# Patient Record
Sex: MALE | Race: WHITE | NOT HISPANIC OR LATINO | Employment: FULL TIME | URBAN - METROPOLITAN AREA
[De-identification: names, ages, dates, MRNs, and addresses within clinical notes are randomized per-mention and may not be internally consistent; named-entity substitution may affect disease eponyms.]

---

## 2018-07-17 ENCOUNTER — APPOINTMENT (OUTPATIENT)
Dept: RADIOLOGY | Facility: HOSPITAL | Age: 28
DRG: 184 | End: 2018-07-17
Payer: COMMERCIAL

## 2018-07-17 ENCOUNTER — APPOINTMENT (EMERGENCY)
Dept: RADIOLOGY | Facility: HOSPITAL | Age: 28
End: 2018-07-17
Payer: COMMERCIAL

## 2018-07-17 ENCOUNTER — HOSPITAL ENCOUNTER (EMERGENCY)
Facility: HOSPITAL | Age: 28
End: 2018-07-17
Attending: EMERGENCY MEDICINE | Admitting: EMERGENCY MEDICINE
Payer: COMMERCIAL

## 2018-07-17 ENCOUNTER — HOSPITAL ENCOUNTER (INPATIENT)
Facility: HOSPITAL | Age: 28
LOS: 2 days | Discharge: HOME | DRG: 184 | End: 2018-07-20
Attending: EMERGENCY MEDICINE | Admitting: SURGERY
Payer: COMMERCIAL

## 2018-07-17 VITALS
DIASTOLIC BLOOD PRESSURE: 67 MMHG | WEIGHT: 180 LBS | OXYGEN SATURATION: 96 % | RESPIRATION RATE: 18 BRPM | HEART RATE: 84 BPM | TEMPERATURE: 99.4 F | SYSTOLIC BLOOD PRESSURE: 148 MMHG

## 2018-07-17 DIAGNOSIS — I31.3 PERICARDIAL EFFUSION: ICD-10-CM

## 2018-07-17 DIAGNOSIS — S22.20XA: ICD-10-CM

## 2018-07-17 DIAGNOSIS — S22.009A CLOSED FRACTURE OF TRANSVERSE PROCESS OF THORACIC VERTEBRA, INITIAL ENCOUNTER (HCC): ICD-10-CM

## 2018-07-17 DIAGNOSIS — S22.49XA MULTIPLE RIB FRACTURES: Primary | ICD-10-CM

## 2018-07-17 DIAGNOSIS — S62.640A CLOSED NONDISPLACED FRACTURE OF PROXIMAL PHALANX OF RIGHT INDEX FINGER, INITIAL ENCOUNTER: Primary | ICD-10-CM

## 2018-07-17 DIAGNOSIS — S62.609A FINGER FRACTURE, RIGHT: ICD-10-CM

## 2018-07-17 PROBLEM — S62.600A: Status: ACTIVE | Noted: 2018-07-17

## 2018-07-17 PROBLEM — S22.43XA CLOSED FRACTURE OF MULTIPLE RIBS OF BOTH SIDES: Status: ACTIVE | Noted: 2018-07-17

## 2018-07-17 PROBLEM — S22.21XA CLOSED FRACTURE OF MANUBRIUM: Status: ACTIVE | Noted: 2018-07-17

## 2018-07-17 PROBLEM — S22.009D CLOSED FRACTURE OF TRANSVERSE PROCESS OF THORACIC VERTEBRA WITH ROUTINE HEALING: Status: ACTIVE | Noted: 2018-07-17

## 2018-07-17 LAB
ALBUMIN SERPL BCP-MCNC: 3.7 G/DL (ref 3.5–5)
ALP SERPL-CCNC: 60 U/L (ref 46–116)
ALT SERPL W P-5'-P-CCNC: 45 U/L (ref 12–78)
ANION GAP SERPL CALCULATED.3IONS-SCNC: 8 MMOL/L (ref 4–13)
APTT PPP: 27 SECONDS (ref 24–33)
AST SERPL W P-5'-P-CCNC: 49 U/L (ref 5–45)
BASOPHILS # BLD AUTO: 0.06 THOUSANDS/ΜL (ref 0–0.1)
BASOPHILS NFR BLD AUTO: 0 % (ref 0–1)
BILIRUB SERPL-MCNC: 1 MG/DL (ref 0.2–1)
BUN SERPL-MCNC: 11 MG/DL (ref 5–25)
CALCIUM SERPL-MCNC: 9.1 MG/DL (ref 8.3–10.1)
CHLORIDE SERPL-SCNC: 101 MMOL/L (ref 100–108)
CO2 SERPL-SCNC: 28 MMOL/L (ref 21–32)
CREAT SERPL-MCNC: 0.96 MG/DL (ref 0.6–1.3)
EOSINOPHIL # BLD AUTO: 0.28 THOUSAND/ΜL (ref 0–0.61)
EOSINOPHIL NFR BLD AUTO: 2 % (ref 0–6)
ERYTHROCYTE [DISTWIDTH] IN BLOOD BY AUTOMATED COUNT: 12.7 % (ref 11.6–15.1)
GFR SERPL CREATININE-BSD FRML MDRD: 109 ML/MIN/1.73SQ M
GLUCOSE SERPL-MCNC: 100 MG/DL (ref 65–140)
HCT VFR BLD AUTO: 39.1 % (ref 36.5–49.3)
HGB BLD-MCNC: 12.9 G/DL (ref 12–17)
IMM GRANULOCYTES # BLD AUTO: 0.06 THOUSAND/UL (ref 0–0.2)
IMM GRANULOCYTES NFR BLD AUTO: 0 % (ref 0–2)
INR PPP: 0.97 (ref 0.86–1.16)
LYMPHOCYTES # BLD AUTO: 1.38 THOUSANDS/ΜL (ref 0.6–4.47)
LYMPHOCYTES NFR BLD AUTO: 10 % (ref 14–44)
MCH RBC QN AUTO: 28.5 PG (ref 26.8–34.3)
MCHC RBC AUTO-ENTMCNC: 33 G/DL (ref 31.4–37.4)
MCV RBC AUTO: 87 FL (ref 82–98)
MONOCYTES # BLD AUTO: 1.51 THOUSAND/ΜL (ref 0.17–1.22)
MONOCYTES NFR BLD AUTO: 11 % (ref 4–12)
NEUTROPHILS # BLD AUTO: 10.81 THOUSANDS/ΜL (ref 1.85–7.62)
NEUTS SEG NFR BLD AUTO: 77 % (ref 43–75)
NRBC BLD AUTO-RTO: 0 /100 WBCS
PLATELET # BLD AUTO: 237 THOUSANDS/UL (ref 149–390)
PMV BLD AUTO: 8.9 FL (ref 8.9–12.7)
POTASSIUM SERPL-SCNC: 3.5 MMOL/L (ref 3.5–5.3)
PROT SERPL-MCNC: 7.3 G/DL (ref 6.4–8.2)
PROTHROMBIN TIME: 10.2 SECONDS (ref 9.4–11.7)
RBC # BLD AUTO: 4.52 MILLION/UL (ref 3.88–5.62)
SODIUM SERPL-SCNC: 137 MMOL/L (ref 136–145)
TROPONIN I SERPL-MCNC: <0.02 NG/ML
TROPONIN I SERPL-MCNC: <0.02 NG/ML
WBC # BLD AUTO: 14.1 THOUSAND/UL (ref 4.31–10.16)

## 2018-07-17 PROCEDURE — 85025 COMPLETE CBC W/AUTO DIFF WBC: CPT | Performed by: EMERGENCY MEDICINE

## 2018-07-17 PROCEDURE — 85730 THROMBOPLASTIN TIME PARTIAL: CPT | Performed by: EMERGENCY MEDICINE

## 2018-07-17 PROCEDURE — 84484 ASSAY OF TROPONIN QUANT: CPT | Performed by: STUDENT IN AN ORGANIZED HEALTH CARE EDUCATION/TRAINING PROGRAM

## 2018-07-17 PROCEDURE — 99222 1ST HOSP IP/OBS MODERATE 55: CPT | Performed by: SURGERY

## 2018-07-17 PROCEDURE — 72170 X-RAY EXAM OF PELVIS: CPT

## 2018-07-17 PROCEDURE — 93005 ELECTROCARDIOGRAM TRACING: CPT

## 2018-07-17 PROCEDURE — 72125 CT NECK SPINE W/O DYE: CPT

## 2018-07-17 PROCEDURE — 85610 PROTHROMBIN TIME: CPT | Performed by: EMERGENCY MEDICINE

## 2018-07-17 PROCEDURE — 74177 CT ABD & PELVIS W/CONTRAST: CPT

## 2018-07-17 PROCEDURE — 70450 CT HEAD/BRAIN W/O DYE: CPT

## 2018-07-17 PROCEDURE — 73140 X-RAY EXAM OF FINGER(S): CPT

## 2018-07-17 PROCEDURE — 71250 CT THORAX DX C-: CPT

## 2018-07-17 PROCEDURE — 90715 TDAP VACCINE 7 YRS/> IM: CPT | Performed by: STUDENT IN AN ORGANIZED HEALTH CARE EDUCATION/TRAINING PROGRAM

## 2018-07-17 PROCEDURE — 94760 N-INVAS EAR/PLS OXIMETRY 1: CPT

## 2018-07-17 PROCEDURE — 73130 X-RAY EXAM OF HAND: CPT

## 2018-07-17 PROCEDURE — 99285 EMERGENCY DEPT VISIT HI MDM: CPT

## 2018-07-17 PROCEDURE — 90471 IMMUNIZATION ADMIN: CPT

## 2018-07-17 PROCEDURE — 80053 COMPREHEN METABOLIC PANEL: CPT | Performed by: EMERGENCY MEDICINE

## 2018-07-17 PROCEDURE — 71260 CT THORAX DX C+: CPT

## 2018-07-17 PROCEDURE — 36415 COLL VENOUS BLD VENIPUNCTURE: CPT | Performed by: EMERGENCY MEDICINE

## 2018-07-17 RX ORDER — OXYCODONE HYDROCHLORIDE 5 MG/1
5 TABLET ORAL EVERY 4 HOURS PRN
Status: DISCONTINUED | OUTPATIENT
Start: 2018-07-17 | End: 2018-07-19

## 2018-07-17 RX ORDER — DIAZEPAM 5 MG/1
5 TABLET ORAL EVERY 6 HOURS PRN
Status: DISCONTINUED | OUTPATIENT
Start: 2018-07-17 | End: 2018-07-19

## 2018-07-17 RX ORDER — ONDANSETRON 2 MG/ML
4 INJECTION INTRAMUSCULAR; INTRAVENOUS EVERY 4 HOURS PRN
Status: DISCONTINUED | OUTPATIENT
Start: 2018-07-17 | End: 2018-07-18

## 2018-07-17 RX ORDER — SODIUM CHLORIDE, SODIUM GLUCONATE, SODIUM ACETATE, POTASSIUM CHLORIDE, MAGNESIUM CHLORIDE, SODIUM PHOSPHATE, DIBASIC, AND POTASSIUM PHOSPHATE .53; .5; .37; .037; .03; .012; .00082 G/100ML; G/100ML; G/100ML; G/100ML; G/100ML; G/100ML; G/100ML
50 INJECTION, SOLUTION INTRAVENOUS CONTINUOUS
Status: DISCONTINUED | OUTPATIENT
Start: 2018-07-17 | End: 2018-07-20

## 2018-07-17 RX ORDER — LIDOCAINE 50 MG/G
2 PATCH TOPICAL DAILY
Status: DISCONTINUED | OUTPATIENT
Start: 2018-07-17 | End: 2018-07-20 | Stop reason: HOSPADM

## 2018-07-17 RX ORDER — LIDOCAINE 50 MG/G
1 PATCH TOPICAL DAILY
Status: DISCONTINUED | OUTPATIENT
Start: 2018-07-17 | End: 2018-07-17

## 2018-07-17 RX ORDER — METHOCARBAMOL 500 MG/1
500 TABLET, FILM COATED ORAL EVERY 6 HOURS SCHEDULED
Status: DISCONTINUED | OUTPATIENT
Start: 2018-07-17 | End: 2018-07-18

## 2018-07-17 RX ORDER — ACETAMINOPHEN 325 MG/1
650 TABLET ORAL EVERY 6 HOURS SCHEDULED
Status: DISCONTINUED | OUTPATIENT
Start: 2018-07-17 | End: 2018-07-18

## 2018-07-17 RX ORDER — ONDANSETRON 2 MG/ML
4 INJECTION INTRAMUSCULAR; INTRAVENOUS ONCE
Status: COMPLETED | OUTPATIENT
Start: 2018-07-17 | End: 2018-07-17

## 2018-07-17 RX ORDER — OXYCODONE HYDROCHLORIDE 10 MG/1
10 TABLET ORAL EVERY 4 HOURS PRN
Status: DISCONTINUED | OUTPATIENT
Start: 2018-07-17 | End: 2018-07-19

## 2018-07-17 RX ORDER — OXYCODONE HYDROCHLORIDE AND ACETAMINOPHEN 5; 325 MG/1; MG/1
1 TABLET ORAL ONCE
Status: COMPLETED | OUTPATIENT
Start: 2018-07-17 | End: 2018-07-17

## 2018-07-17 RX ADMIN — DIAZEPAM 5 MG: 5 TABLET ORAL at 20:12

## 2018-07-17 RX ADMIN — OXYCODONE HYDROCHLORIDE AND ACETAMINOPHEN 1 TABLET: 5; 325 TABLET ORAL at 12:20

## 2018-07-17 RX ADMIN — IOHEXOL 100 ML: 350 INJECTION, SOLUTION INTRAVENOUS at 20:40

## 2018-07-17 RX ADMIN — ONDANSETRON 4 MG: 2 INJECTION, SOLUTION INTRAMUSCULAR; INTRAVENOUS at 20:12

## 2018-07-17 RX ADMIN — HYDROMORPHONE HYDROCHLORIDE 0.2 MG: 1 INJECTION, SOLUTION INTRAMUSCULAR; INTRAVENOUS; SUBCUTANEOUS at 21:08

## 2018-07-17 RX ADMIN — ACETAMINOPHEN 650 MG: 325 TABLET, FILM COATED ORAL at 17:48

## 2018-07-17 RX ADMIN — ONDANSETRON 4 MG: 2 INJECTION, SOLUTION INTRAMUSCULAR; INTRAVENOUS at 18:04

## 2018-07-17 RX ADMIN — ACETAMINOPHEN 650 MG: 325 TABLET, FILM COATED ORAL at 23:14

## 2018-07-17 RX ADMIN — LIDOCAINE 2 PATCH: 50 PATCH CUTANEOUS at 17:55

## 2018-07-17 RX ADMIN — METHOCARBAMOL 500 MG: 500 TABLET ORAL at 17:48

## 2018-07-17 RX ADMIN — HYDROMORPHONE HYDROCHLORIDE 1 MG: 1 INJECTION, SOLUTION INTRAMUSCULAR; INTRAVENOUS; SUBCUTANEOUS at 13:24

## 2018-07-17 RX ADMIN — METHOCARBAMOL 500 MG: 500 TABLET ORAL at 23:14

## 2018-07-17 RX ADMIN — OXYCODONE HYDROCHLORIDE 10 MG: 10 TABLET ORAL at 23:14

## 2018-07-17 RX ADMIN — OXYCODONE HYDROCHLORIDE 10 MG: 10 TABLET ORAL at 18:08

## 2018-07-17 RX ADMIN — SODIUM CHLORIDE, SODIUM ACETATE ANHYDROUS, SODIUM GLUCONATE, POTASSIUM CHLORIDE, AND MAGNESIUM CHLORIDE 85 ML/HR: 526; 222; 502; 37; 30 INJECTION, SOLUTION INTRAVENOUS at 17:49

## 2018-07-17 RX ADMIN — TETANUS TOXOID, REDUCED DIPHTHERIA TOXOID AND ACELLULAR PERTUSSIS VACCINE, ADSORBED 0.5 ML: 5; 2.5; 8; 8; 2.5 SUSPENSION INTRAMUSCULAR at 18:32
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
## 2018-07-17 NOTE — EMTALA/ACUTE CARE TRANSFER
[facility] EMERGENCY DEPARTMENT  [address] [id number]  Dept: [phone]      EMTALA TRANSFER CONSENT    NAME Ita Catherine                                         1990                              MRN [id number]    I have been informed of my rights regarding examination, treatment, and transfer   by Dr Jessica Azevedo DO    Benefits: Specialized equipment and/or services available at the receiving facility (Include comment)________________________    Risks: Potential for delay in receiving treatment, Potential deterioration of medical condition, Loss of IV, Increased discomfort during transfer, Possible worsening of condition or death during transfer      Transfer Request   I acknowledge that my medical condition has been evaluated and explained to me by the emergency department physician or other qualified medical person and/or my attending physician who has recommended and offered to me further medical examination and treatment  I understand the Hospital's obligation with respect to the treatment and stabilization of my emergency medical condition  I nevertheless request to be transferred  I release the Hospital, the doctor, and any other persons caring for me from all responsibility or liability for any injury or ill effects that may result from my transfer and agree to accept all responsibility for the consequences of my choice to transfer, rather than receive stabilizing treatment at the Hospital  I understand that because the transfer is my request, my insurance may not provide reimbursement for the services  The Hospital will assist and direct me and my family in how to make arrangements for transfer, but the hospital is not liable for any fees charged by the transport service    In spite of this understanding, I refuse to consent to further medical examination and treatment which has been offered to me, and request transfer to  [place]  Name, [facility] & State : [place]  I authorize the performance of emergency medical procedures and treatments upon me in both transit and upon arrival at the receiving facility  Additionally, I authorize the release of any and all medical records to the receiving facility and request they be transported with me, if possible  I authorize the performance of emergency medical procedures and treatments upon me in both transit and upon arrival at the receiving facility  Additionally, I authorize the release of any and all medical records to the receiving facility and request they be transported with me, if possible  I understand that the safest mode of transportation during a medical emergency is an ambulance and that the Hospital advocates the use of this mode of transport  Risks of traveling to the receiving facility by car, including absence of medical control, life sustaining equipment, such as oxygen, and medical personnel has been explained to me and I fully understand them  (JAK CORRECT BOX BELOW)  [  ]  I consent to the stated transfer and to be transported by ambulance/helicopter  [  ]  I consent to the stated transfer, but refuse transportation by ambulance and accept full responsibility for my transportation by car  I understand the risks of non-ambulance transfers and I exonerate the Hospital and its staff from any deterioration in my condition that results from this refusal     X___________________________________________    DATE  18  TIME________  Signature of patient or legally responsible individual signing on patient behalf           RELATIONSHIP TO PATIENT_________________________          Provider Certification    NAME Clemencia Wagonerwith                                         1990                              MRN [id number]    A medical screening exam was performed on the above named patient    Based on the examination:    Condition Necessitating Transfer The primary encounter diagnosis was Multiple rib fractures  Diagnoses of Closed fracture sternum, Pericardial effusion, Closed fracture of transverse process of thoracic vertebra, initial encounter ([facility] [address] ), and Finger fracture, right were also pertinent to this visit  Patient Condition: The patient has been stabilized such that within reasonable medical probability, no material deterioration of the patient condition or the condition of the unborn child(julian) is likely to result from the transfer    Reason for Transfer: Level of Care needed not available at this facility    Transfer Requirements: Facility [employer] available and qualified personnel available for treatment as acknowledged by    · Agreed to accept transfer and to provide appropriate medical treatment as acknowledged by       Dr Farooq Dow  · Appropriate medical records of the examination and treatment of the patient are provided at the time of transfer   [facility], [address] _______  · Transfer will be performed by qualified personnel from [facility]  and appropriate transfer equipment as required, including the use of necessary and appropriate life support measures      Provider Certification: I have examined the patient and explained the following risks and benefits of being transferred/refusing transfer to the patient/family:  General risk, such as traffic hazards, adverse weather conditions, rough terrain or turbulence, possible failure of equipment (including vehicle or aircraft), or consequences of actions of persons outside the control of the transport personnel      Based on these reasonable risks and benefits to the patient and/or the unborn child(julian), and based upon the information available at the time of the patients examination, I certify that the medical benefits reasonably to be expected from the provision of appropriate medical treatments at another medical facility outweigh the increasing risks, if any, to the individuals medical condition, and in the case of labor to the unborn child, from effecting the transfer      X____________________________________________ DATE 07/17/18        TIME_______      ORIGINAL - SEND TO MEDICAL RECORDS   COPY - SEND WITH PATIENT DURING TRANSFER
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
## 2018-07-17 NOTE — EMTALA/ACUTE CARE TRANSFER
[facility] EMERGENCY DEPARTMENT  [address] [facility] [address]  Dept: [phone]      EMTALA TRANSFER CONSENT    NAME Marlin Thomas                                         1990                              MRN [id number]    I have been informed of my rights regarding examination, treatment, and transfer   by Dr Madeleine Clancy DO    Benefits: Specialized equipment and/or services available at the receiving facility (Include comment)________________________    Risks: Potential for delay in receiving treatment, Potential deterioration of medical condition, Loss of IV, Increased discomfort during transfer, Possible worsening of condition or death during transfer      Consent for Transfer:  I acknowledge that my medical condition has been evaluated and explained to me by the emergency department physician or other qualified medical person and/or my attending physician, who has recommended that I be transferred to the service of  Accepting Physician: Dr Kristi Neal at [facility] [place]  The above potential benefits of such transfer, the potential risks associated with such transfer, and the probable risks of not being transferred have been explained to me, and I fully understand them  The doctor has explained that, in my case, the benefits of transfer outweigh the risks  I agree to be transferred  I authorize the performance of emergency medical procedures and treatments upon me in both transit and upon arrival at the receiving facility  Additionally, I authorize the release of any and all medical records to the receiving facility and request they be transported with me, if possible  I understand that the safest mode of transportation during a medical emergency is an ambulance and that the Hospital advocates the use of this mode of transport   Risks of traveling to the receiving facility by car, including absence of medical control, life sustaining equipment, such as oxygen, and medical personnel has been explained to me and I fully understand them  (JAK CORRECT BOX BELOW)  [  ]  I consent to the stated transfer and to be transported by ambulance/helicopter  [  ]  I consent to the stated transfer, but refuse transportation by ambulance and accept full responsibility for my transportation by car  I understand the risks of non-ambulance transfers and I exonerate the Hospital and its staff from any deterioration in my condition that results from this refusal     X___________________________________________    DATE  18  TIME________  Signature of patient or legally responsible individual signing on patient behalf           RELATIONSHIP TO PATIENT_________________________          Provider Certification    NAME Henry Feliciano                                         1990                              MRN [id number]    A medical screening exam was performed on the above named patient  Based on the examination:    Condition Necessitating Transfer The primary encounter diagnosis was Multiple rib fractures  Diagnoses of Closed fracture sternum, Pericardial effusion, and Closed fracture of transverse process of thoracic vertebra, initial encounter [facility]) were also pertinent to this visit      Patient Condition: The patient has been stabilized such that within reasonable medical probability, no material deterioration of the patient condition or the condition of the unborn child(julian) is likely to result from the transfer    Reason for Transfer: Level of Care needed not available at this facility    Transfer Requirements: Facility [employer] available and qualified personnel available for treatment as acknowledged by    · Agreed to accept transfer and to provide appropriate medical treatment as acknowledged by       Dr Arline Ortiz  · Appropriate medical records of the examination and treatment of the patient are provided at the time of transfer STAFF INITIAL WHEN COMPLETED _______  · Transfer will be performed by qualified personnel from    and appropriate transfer equipment as required, including the use of necessary and appropriate life support measures  Provider Certification: I have examined the patient and explained the following risks and benefits of being transferred/refusing transfer to the patient/family:  General risk, such as traffic hazards, adverse weather conditions, rough terrain or turbulence, possible failure of equipment (including vehicle or aircraft), or consequences of actions of persons outside the control of the transport personnel      Based on these reasonable risks and benefits to the patient and/or the unborn child(julian), and based upon the information available at the time of the patients examination, I certify that the medical benefits reasonably to be expected from the provision of appropriate medical treatments at another medical facility outweigh the increasing risks, if any, to the individuals medical condition, and in the case of labor to the unborn child, from effecting the transfer      X____________________________________________ DATE 07/17/18        TIME_______      ORIGINAL - SEND TO MEDICAL RECORDS   COPY - SEND WITH PATIENT DURING TRANSFER

## 2018-07-18 ENCOUNTER — APPOINTMENT (OUTPATIENT)
Dept: RADIOLOGY | Facility: HOSPITAL | Age: 28
DRG: 184 | End: 2018-07-18
Payer: COMMERCIAL

## 2018-07-18 ENCOUNTER — APPOINTMENT (OUTPATIENT)
Dept: NON INVASIVE DIAGNOSTICS | Facility: HOSPITAL | Age: 28
DRG: 184 | End: 2018-07-18
Payer: COMMERCIAL

## 2018-07-18 LAB
ANION GAP SERPL CALCULATED.3IONS-SCNC: 6 MMOL/L (ref 4–13)
ATRIAL RATE: 90 BPM
BASOPHILS # BLD AUTO: 0.06 THOUSANDS/ΜL (ref 0–0.1)
BASOPHILS NFR BLD AUTO: 1 % (ref 0–1)
BUN SERPL-MCNC: 13 MG/DL (ref 5–25)
CALCIUM SERPL-MCNC: 8.8 MG/DL (ref 8.3–10.1)
CHLORIDE SERPL-SCNC: 99 MMOL/L (ref 100–108)
CO2 SERPL-SCNC: 27 MMOL/L (ref 21–32)
CREAT SERPL-MCNC: 0.85 MG/DL (ref 0.6–1.3)
EOSINOPHIL # BLD AUTO: 0.09 THOUSAND/ΜL (ref 0–0.61)
EOSINOPHIL NFR BLD AUTO: 1 % (ref 0–6)
ERYTHROCYTE [DISTWIDTH] IN BLOOD BY AUTOMATED COUNT: 12.7 % (ref 11.6–15.1)
GFR SERPL CREATININE-BSD FRML MDRD: 120 ML/MIN/1.73SQ M
GLUCOSE SERPL-MCNC: 93 MG/DL (ref 65–140)
HCT VFR BLD AUTO: 36.5 % (ref 36.5–49.3)
HGB BLD-MCNC: 11.9 G/DL (ref 12–17)
IMM GRANULOCYTES # BLD AUTO: 0.04 THOUSAND/UL (ref 0–0.2)
IMM GRANULOCYTES NFR BLD AUTO: 0 % (ref 0–2)
LYMPHOCYTES # BLD AUTO: 0.97 THOUSANDS/ΜL (ref 0.6–4.47)
LYMPHOCYTES NFR BLD AUTO: 8 % (ref 14–44)
MCH RBC QN AUTO: 28.1 PG (ref 26.8–34.3)
MCHC RBC AUTO-ENTMCNC: 32.6 G/DL (ref 31.4–37.4)
MCV RBC AUTO: 86 FL (ref 82–98)
MONOCYTES # BLD AUTO: 1.82 THOUSAND/ΜL (ref 0.17–1.22)
MONOCYTES NFR BLD AUTO: 15 % (ref 4–12)
NEUTROPHILS # BLD AUTO: 9.29 THOUSANDS/ΜL (ref 1.85–7.62)
NEUTS SEG NFR BLD AUTO: 75 % (ref 43–75)
NRBC BLD AUTO-RTO: 0 /100 WBCS
P AXIS: 71 DEGREES
PLATELET # BLD AUTO: 223 THOUSANDS/UL (ref 149–390)
PMV BLD AUTO: 9.6 FL (ref 8.9–12.7)
POTASSIUM SERPL-SCNC: 3.4 MMOL/L (ref 3.5–5.3)
PR INTERVAL: 156 MS
QRS AXIS: 91 DEGREES
QRSD INTERVAL: 104 MS
QT INTERVAL: 328 MS
QTC INTERVAL: 401 MS
RBC # BLD AUTO: 4.24 MILLION/UL (ref 3.88–5.62)
SODIUM SERPL-SCNC: 132 MMOL/L (ref 136–145)
T WAVE AXIS: 83 DEGREES
VENTRICULAR RATE: 90 BPM
WBC # BLD AUTO: 12.27 THOUSAND/UL (ref 4.31–10.16)

## 2018-07-18 PROCEDURE — G8988 SELF CARE GOAL STATUS: HCPCS

## 2018-07-18 PROCEDURE — 97535 SELF CARE MNGMENT TRAINING: CPT

## 2018-07-18 PROCEDURE — 97163 PT EVAL HIGH COMPLEX 45 MIN: CPT

## 2018-07-18 PROCEDURE — G8987 SELF CARE CURRENT STATUS: HCPCS

## 2018-07-18 PROCEDURE — G8978 MOBILITY CURRENT STATUS: HCPCS

## 2018-07-18 PROCEDURE — 99221 1ST HOSP IP/OBS SF/LOW 40: CPT | Performed by: INTERNAL MEDICINE

## 2018-07-18 PROCEDURE — 94760 N-INVAS EAR/PLS OXIMETRY 1: CPT

## 2018-07-18 PROCEDURE — 80048 BASIC METABOLIC PNL TOTAL CA: CPT | Performed by: STUDENT IN AN ORGANIZED HEALTH CARE EDUCATION/TRAINING PROGRAM

## 2018-07-18 PROCEDURE — 85025 COMPLETE CBC W/AUTO DIFF WBC: CPT | Performed by: STUDENT IN AN ORGANIZED HEALTH CARE EDUCATION/TRAINING PROGRAM

## 2018-07-18 PROCEDURE — 87040 BLOOD CULTURE FOR BACTERIA: CPT | Performed by: STUDENT IN AN ORGANIZED HEALTH CARE EDUCATION/TRAINING PROGRAM

## 2018-07-18 PROCEDURE — G8979 MOBILITY GOAL STATUS: HCPCS

## 2018-07-18 PROCEDURE — 93306 TTE W/DOPPLER COMPLETE: CPT

## 2018-07-18 PROCEDURE — 93306 TTE W/DOPPLER COMPLETE: CPT | Performed by: INTERNAL MEDICINE

## 2018-07-18 PROCEDURE — 97167 OT EVAL HIGH COMPLEX 60 MIN: CPT

## 2018-07-18 PROCEDURE — 93010 ELECTROCARDIOGRAM REPORT: CPT | Performed by: INTERNAL MEDICINE

## 2018-07-18 PROCEDURE — 71046 X-RAY EXAM CHEST 2 VIEWS: CPT

## 2018-07-18 RX ORDER — ACETAMINOPHEN 325 MG/1
975 TABLET ORAL EVERY 8 HOURS SCHEDULED
Status: DISCONTINUED | OUTPATIENT
Start: 2018-07-18 | End: 2018-07-20 | Stop reason: HOSPADM

## 2018-07-18 RX ORDER — DIAZEPAM 5 MG/1
5 TABLET ORAL EVERY 8 HOURS
Status: DISCONTINUED | OUTPATIENT
Start: 2018-07-18 | End: 2018-07-19

## 2018-07-18 RX ORDER — NICOTINE 21 MG/24HR
14 PATCH, TRANSDERMAL 24 HOURS TRANSDERMAL DAILY
Status: DISCONTINUED | OUTPATIENT
Start: 2018-07-18 | End: 2018-07-20 | Stop reason: HOSPADM

## 2018-07-18 RX ORDER — GINSENG 100 MG
1 CAPSULE ORAL 2 TIMES DAILY
Status: DISCONTINUED | OUTPATIENT
Start: 2018-07-18 | End: 2018-07-19

## 2018-07-18 RX ORDER — LANOLIN ALCOHOL/MO/W.PET/CERES
6 CREAM (GRAM) TOPICAL
Status: DISCONTINUED | OUTPATIENT
Start: 2018-07-18 | End: 2018-07-20 | Stop reason: HOSPADM

## 2018-07-18 RX ORDER — ONDANSETRON 2 MG/ML
4 INJECTION INTRAMUSCULAR; INTRAVENOUS EVERY 6 HOURS PRN
Status: DISCONTINUED | OUTPATIENT
Start: 2018-07-18 | End: 2018-07-20 | Stop reason: HOSPADM

## 2018-07-18 RX ADMIN — OXYCODONE HYDROCHLORIDE 10 MG: 10 TABLET ORAL at 20:02

## 2018-07-18 RX ADMIN — HYDROMORPHONE HYDROCHLORIDE 0.2 MG: 1 INJECTION, SOLUTION INTRAMUSCULAR; INTRAVENOUS; SUBCUTANEOUS at 00:29

## 2018-07-18 RX ADMIN — LIDOCAINE 2 PATCH: 50 PATCH CUTANEOUS at 08:45

## 2018-07-18 RX ADMIN — METHOCARBAMOL 500 MG: 500 TABLET ORAL at 05:14

## 2018-07-18 RX ADMIN — SODIUM CHLORIDE, SODIUM ACETATE ANHYDROUS, SODIUM GLUCONATE, POTASSIUM CHLORIDE, AND MAGNESIUM CHLORIDE 85 ML/HR: 526; 222; 502; 37; 30 INJECTION, SOLUTION INTRAVENOUS at 05:20

## 2018-07-18 RX ADMIN — DIAZEPAM 5 MG: 5 TABLET ORAL at 17:22

## 2018-07-18 RX ADMIN — ONDANSETRON 4 MG: 2 INJECTION, SOLUTION INTRAMUSCULAR; INTRAVENOUS at 16:36

## 2018-07-18 RX ADMIN — OXYCODONE HYDROCHLORIDE 10 MG: 10 TABLET ORAL at 14:32

## 2018-07-18 RX ADMIN — ACETAMINOPHEN 650 MG: 325 TABLET, FILM COATED ORAL at 05:14

## 2018-07-18 RX ADMIN — ACETAMINOPHEN 975 MG: 325 TABLET, FILM COATED ORAL at 22:31

## 2018-07-18 RX ADMIN — MELATONIN TAB 3 MG 6 MG: 3 TAB at 22:31

## 2018-07-18 RX ADMIN — SODIUM CHLORIDE, SODIUM ACETATE ANHYDROUS, SODIUM GLUCONATE, POTASSIUM CHLORIDE, AND MAGNESIUM CHLORIDE 85 ML/HR: 526; 222; 502; 37; 30 INJECTION, SOLUTION INTRAVENOUS at 18:11

## 2018-07-18 RX ADMIN — ACETAMINOPHEN 975 MG: 325 TABLET, FILM COATED ORAL at 14:31

## 2018-07-18 RX ADMIN — NICOTINE 14 MG: 14 PATCH, EXTENDED RELEASE TRANSDERMAL at 12:49

## 2018-07-18 RX ADMIN — BACITRACIN ZINC 1 SMALL APPLICATION: 500 OINTMENT TOPICAL at 17:22

## 2018-07-18 RX ADMIN — ONDANSETRON 4 MG: 2 INJECTION, SOLUTION INTRAMUSCULAR; INTRAVENOUS at 21:27

## 2018-07-18 RX ADMIN — OXYCODONE HYDROCHLORIDE 10 MG: 10 TABLET ORAL at 08:45

## 2018-07-18 RX ADMIN — MELATONIN TAB 3 MG 6 MG: 3 TAB at 00:29

## 2018-07-18 RX ADMIN — METHOCARBAMOL 500 MG: 500 TABLET ORAL at 12:49

## 2018-07-18 RX ADMIN — OXYCODONE HYDROCHLORIDE 10 MG: 10 TABLET ORAL at 04:45
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
## 2018-07-18 NOTE — PROGRESS NOTES
Nurse / Provider rounds completed with staff nurse, [id number]ys and patient who is currently receiving a TTE and Cardiology consulted to read and evaluate

## 2018-07-18 NOTE — PROGRESS NOTES
Spoke with John Malone NP with trauma team, aware pt drowsy today, low grade temps, Na 132, new orders for cards consult and echo, continue to monitor pt
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
## 2018-07-18 NOTE — PLAN OF CARE
Problem: PAIN - ADULT  Goal: Verbalizes/displays adequate comfort level or baseline comfort level  Interventions:  - Encourage patient to monitor pain and request assistance  - Assess pain using appropriate pain scale  - Administer analgesics based on type and severity of pain and evaluate response  - Implement non-pharmacological measures as appropriate and evaluate response  - Consider cultural and social influences on pain and pain management  - Notify physician/advanced practitioner if interventions unsuccessful or patient reports new pain  Outcome: Progressing      Problem: INFECTION - ADULT  Goal: Absence or prevention of progression during hospitalization  INTERVENTIONS:  - Assess and monitor for signs and symptoms of infection  - Monitor lab/diagnostic results  - Monitor all insertion sites, i e  indwelling lines, tubes, and drains  - Monitor endotracheal (as able) and nasal secretions for changes in amount and color  - [place] appropriate cooling/warming therapies per order  - Administer medications as ordered  - Instruct and encourage patient and family to use good hand hygiene technique  - Identify and instruct in appropriate isolation precautions for identified infection/condition  Outcome: Progressing    Goal: Absence of fever/infection during neutropenic period  INTERVENTIONS:  - Monitor WBC  - Implement neutropenic guidelines  Outcome: Progressing      Problem: SAFETY ADULT  Goal: Patient will remain free of falls  INTERVENTIONS:  - Assess patient frequently for physical needs  -  Identify cognitive and physical deficits and behaviors that affect risk of falls    -  [place] fall precautions as indicated by assessment   - Educate patient/family on patient safety including physical limitations  - Instruct patient to call for assistance with activity based on assessment  - Modify environment to reduce risk of injury  - Consider OT/PT consult to assist with strengthening/mobility  Outcome: Progressing    Goal: Maintain or return to baseline ADL function  INTERVENTIONS:  -  Assess patient's ability to carry out ADLs; assess patient's baseline for ADL function and identify physical deficits which impact ability to perform ADLs (bathing, care of mouth/teeth, toileting, grooming, dressing, etc )  - Assess/evaluate cause of self-care deficits   - Assess range of motion  - Assess patient's mobility; develop plan if impaired  - Assess patient's need for assistive devices and provide as appropriate  - Encourage maximum independence but intervene and supervise when necessary  ¯ Involve family in performance of ADLs  ¯ Assess for home care needs following discharge   ¯ Request OT consult to assist with ADL evaluation and planning for discharge  ¯ Provide patient education as appropriate  Outcome: Progressing    Goal: Maintain or return mobility status to optimal level  INTERVENTIONS:  - Assess patient's baseline mobility status (ambulation, transfers, stairs, etc )    - Identify cognitive and physical deficits and behaviors that affect mobility  - Identify mobility aids required to assist with transfers and/or ambulation (gait belt, sit-to-stand, lift, walker, cane, etc )  - [place] fall precautions as indicated by assessment  - Record patient progress and toleration of activity level on Mobility SBAR; progress patient to next Phase/Stage  - Instruct patient to call for assistance with activity based on assessment  - Request Rehabilitation consult to assist with strengthening/weightbearing, etc   Outcome: Progressing      Problem: DISCHARGE PLANNING  Goal: Discharge to home or other facility with appropriate resources  INTERVENTIONS:  - Identify barriers to discharge w/patient and caregiver  - Arrange for needed discharge resources and transportation as appropriate  - Identify discharge learning needs (meds, wound care, etc )  - Arrange for interpretive services to assist at discharge as needed  - Refer to Case Management Department for coordinating discharge planning if the patient needs post-hospital services based on physician/advanced practitioner order or complex needs related to functional status, cognitive ability, or social support system  Outcome: Progressing
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
## 2018-07-19 PROCEDURE — 97116 GAIT TRAINING THERAPY: CPT

## 2018-07-19 PROCEDURE — 97530 THERAPEUTIC ACTIVITIES: CPT

## 2018-07-19 RX ORDER — HYDROMORPHONE HYDROCHLORIDE 2 MG/1
4 TABLET ORAL EVERY 4 HOURS PRN
Status: DISCONTINUED | OUTPATIENT
Start: 2018-07-19 | End: 2018-07-20 | Stop reason: HOSPADM

## 2018-07-19 RX ORDER — LIDOCAINE 50 MG/G
1 PATCH TOPICAL DAILY
Status: DISCONTINUED | OUTPATIENT
Start: 2018-07-19 | End: 2018-07-19 | Stop reason: SDUPTHER

## 2018-07-19 RX ORDER — PANTOPRAZOLE SODIUM 40 MG/1
40 TABLET, DELAYED RELEASE ORAL
Status: DISCONTINUED | OUTPATIENT
Start: 2018-07-20 | End: 2018-07-20 | Stop reason: HOSPADM

## 2018-07-19 RX ORDER — HYDROMORPHONE HYDROCHLORIDE 2 MG/1
2 TABLET ORAL EVERY 4 HOURS PRN
Status: DISCONTINUED | OUTPATIENT
Start: 2018-07-19 | End: 2018-07-20 | Stop reason: HOSPADM

## 2018-07-19 RX ORDER — GINSENG 100 MG
1 CAPSULE ORAL DAILY
Status: DISCONTINUED | OUTPATIENT
Start: 2018-07-19 | End: 2018-07-20 | Stop reason: HOSPADM

## 2018-07-19 RX ORDER — DIAZEPAM 5 MG/1
5 TABLET ORAL EVERY 6 HOURS SCHEDULED
Status: DISCONTINUED | OUTPATIENT
Start: 2018-07-19 | End: 2018-07-20 | Stop reason: HOSPADM

## 2018-07-19 RX ADMIN — OXYCODONE HYDROCHLORIDE 10 MG: 10 TABLET ORAL at 13:19

## 2018-07-19 RX ADMIN — DIAZEPAM 5 MG: 5 TABLET ORAL at 02:55

## 2018-07-19 RX ADMIN — ACETAMINOPHEN 975 MG: 325 TABLET, FILM COATED ORAL at 21:06

## 2018-07-19 RX ADMIN — LIDOCAINE 1 PATCH: 50 PATCH CUTANEOUS at 13:46

## 2018-07-19 RX ADMIN — ONDANSETRON 4 MG: 2 INJECTION, SOLUTION INTRAMUSCULAR; INTRAVENOUS at 19:56

## 2018-07-19 RX ADMIN — DIAZEPAM 5 MG: 5 TABLET ORAL at 14:21

## 2018-07-19 RX ADMIN — ACETAMINOPHEN 975 MG: 325 TABLET, FILM COATED ORAL at 13:20

## 2018-07-19 RX ADMIN — SODIUM CHLORIDE, SODIUM ACETATE ANHYDROUS, SODIUM GLUCONATE, POTASSIUM CHLORIDE, AND MAGNESIUM CHLORIDE 85 ML/HR: 526; 222; 502; 37; 30 INJECTION, SOLUTION INTRAVENOUS at 05:25

## 2018-07-19 RX ADMIN — ACETAMINOPHEN 975 MG: 325 TABLET, FILM COATED ORAL at 05:25

## 2018-07-19 RX ADMIN — HYDROMORPHONE HYDROCHLORIDE 2 MG: 2 TABLET ORAL at 16:51

## 2018-07-19 RX ADMIN — SODIUM CHLORIDE, SODIUM ACETATE ANHYDROUS, SODIUM GLUCONATE, POTASSIUM CHLORIDE, AND MAGNESIUM CHLORIDE 85 ML/HR: 526; 222; 502; 37; 30 INJECTION, SOLUTION INTRAVENOUS at 16:54

## 2018-07-19 RX ADMIN — DIAZEPAM 5 MG: 5 TABLET ORAL at 09:28

## 2018-07-19 RX ADMIN — MELATONIN TAB 3 MG 6 MG: 3 TAB at 21:07

## 2018-07-19 RX ADMIN — OXYCODONE HYDROCHLORIDE 10 MG: 10 TABLET ORAL at 09:28

## 2018-07-19 RX ADMIN — LIDOCAINE 2 PATCH: 50 PATCH CUTANEOUS at 09:29

## 2018-07-19 RX ADMIN — HYDROMORPHONE HYDROCHLORIDE 0.2 MG: 1 INJECTION, SOLUTION INTRAMUSCULAR; INTRAVENOUS; SUBCUTANEOUS at 19:55

## 2018-07-19 RX ADMIN — DIAZEPAM 5 MG: 5 TABLET ORAL at 18:21

## 2018-07-19 RX ADMIN — BACITRACIN ZINC 1 SMALL APPLICATION: 500 OINTMENT TOPICAL at 18:21

## 2018-07-19 RX ADMIN — BACITRACIN ZINC 1 SMALL APPLICATION: 500 OINTMENT TOPICAL at 09:29

## 2018-07-19 NOTE — ASSESSMENT & PLAN NOTE
Continue multimodal pain management  Aggressive pulmonary toilet  Incentive spirometery  Cardiology consulted, appreciate recs  ECHO reviewed  Mild TR  Otherwise WNL  No further work up needed    SCDs/Lovenox

## 2018-07-19 NOTE — ASSESSMENT & PLAN NOTE
Continue OT/PT   Awaiting updated discharge recommendations  Encourage OOB/Ambulation  Dispo planning

## 2018-07-19 NOTE — SOCIAL WORK
CM met with pt to discuss the role of CM  Pt's auto is through [employer]  He will submit claim  Pt has no medical insurance  [employer] referral placed  Pt lives alone in a 3 story home which has 10STE and 12 steps inside  Pt works, drives, and was fully independent PTA  Pt's pharmacy is [facility] in [place]  Pt has no DME or living will  Pt has no hx of mental health, substance abuse or IP rehab  Pt is snf v home but pt will need to provide auto information to assist with dispo plan

## 2018-07-19 NOTE — PLAN OF CARE
Discharge to home or other facility with appropriate resources Progressing      Absence or prevention of progression during hospitalization Progressing      Absence of fever/infection during neutropenic period Progressing      Verbalizes/displays adequate comfort level or baseline comfort level Progressing      Patient will remain free of falls Progressing      Achieves optimal ventilation and oxygenation Progressing      Patient will remain free of falls Progressing      Maintain or return to baseline ADL function Progressing      Maintain or return mobility status to optimal level Progressing      Skin integrity remains intact Progressing      Incision(s), wounds(s) or drain site(s) healing without S/S of infection Progressing      Oral mucous membranes remain intact Progressing
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
## 2018-07-20 VITALS
DIASTOLIC BLOOD PRESSURE: 60 MMHG | HEART RATE: 60 BPM | OXYGEN SATURATION: 98 % | BODY MASS INDEX: 23.86 KG/M2 | HEIGHT: 73 IN | SYSTOLIC BLOOD PRESSURE: 115 MMHG | RESPIRATION RATE: 16 BRPM | TEMPERATURE: 98 F | WEIGHT: 180 LBS

## 2018-07-20 PROBLEM — R11.2 NAUSEA & VOMITING: Status: ACTIVE | Noted: 2018-07-20

## 2018-07-20 PROBLEM — S62.640A CLOSED NONDISPLACED FRACTURE OF PROXIMAL PHALANX OF RIGHT INDEX FINGER: Status: ACTIVE | Noted: 2018-07-17

## 2018-07-20 LAB
ANION GAP SERPL CALCULATED.3IONS-SCNC: 5 MMOL/L (ref 4–13)
BUN SERPL-MCNC: 5 MG/DL (ref 5–25)
CALCIUM SERPL-MCNC: 8.9 MG/DL (ref 8.3–10.1)
CHLORIDE SERPL-SCNC: 104 MMOL/L (ref 100–108)
CO2 SERPL-SCNC: 29 MMOL/L (ref 21–32)
CREAT SERPL-MCNC: 0.75 MG/DL (ref 0.6–1.3)
ERYTHROCYTE [DISTWIDTH] IN BLOOD BY AUTOMATED COUNT: 12.5 % (ref 11.6–15.1)
GFR SERPL CREATININE-BSD FRML MDRD: 127 ML/MIN/1.73SQ M
GLUCOSE SERPL-MCNC: 102 MG/DL (ref 65–140)
HCT VFR BLD AUTO: 35.7 % (ref 36.5–49.3)
HGB BLD-MCNC: 11.9 G/DL (ref 12–17)
MCH RBC QN AUTO: 28.5 PG (ref 26.8–34.3)
MCHC RBC AUTO-ENTMCNC: 33.3 G/DL (ref 31.4–37.4)
MCV RBC AUTO: 85 FL (ref 82–98)
PLATELET # BLD AUTO: 244 THOUSANDS/UL (ref 149–390)
PMV BLD AUTO: 9 FL (ref 8.9–12.7)
POTASSIUM SERPL-SCNC: 3.7 MMOL/L (ref 3.5–5.3)
RBC # BLD AUTO: 4.18 MILLION/UL (ref 3.88–5.62)
SODIUM SERPL-SCNC: 138 MMOL/L (ref 136–145)
WBC # BLD AUTO: 6.09 THOUSAND/UL (ref 4.31–10.16)

## 2018-07-20 PROCEDURE — 97530 THERAPEUTIC ACTIVITIES: CPT

## 2018-07-20 PROCEDURE — 80048 BASIC METABOLIC PNL TOTAL CA: CPT | Performed by: SURGERY

## 2018-07-20 PROCEDURE — 99238 HOSP IP/OBS DSCHRG MGMT 30/<: CPT | Performed by: SURGERY

## 2018-07-20 PROCEDURE — 85027 COMPLETE CBC AUTOMATED: CPT | Performed by: SURGERY

## 2018-07-20 PROCEDURE — 97116 GAIT TRAINING THERAPY: CPT

## 2018-07-20 PROCEDURE — 97535 SELF CARE MNGMENT TRAINING: CPT

## 2018-07-20 RX ORDER — POTASSIUM CHLORIDE 20 MEQ/1
40 TABLET, EXTENDED RELEASE ORAL ONCE
Status: COMPLETED | OUTPATIENT
Start: 2018-07-20 | End: 2018-07-20

## 2018-07-20 RX ORDER — DOCUSATE SODIUM 100 MG/1
100 CAPSULE, LIQUID FILLED ORAL 2 TIMES DAILY
Status: DISCONTINUED | OUTPATIENT
Start: 2018-07-20 | End: 2018-07-20 | Stop reason: HOSPADM

## 2018-07-20 RX ORDER — ACETAMINOPHEN 325 MG/1
975 TABLET ORAL EVERY 8 HOURS PRN
Qty: 30 TABLET | Refills: 0 | Status: SHIPPED | OUTPATIENT
Start: 2018-07-20 | End: 2018-07-24 | Stop reason: HOSPADM

## 2018-07-20 RX ORDER — DOCUSATE SODIUM 100 MG/1
100 CAPSULE, LIQUID FILLED ORAL 2 TIMES DAILY
Qty: 10 CAPSULE | Refills: 0
Start: 2018-07-20

## 2018-07-20 RX ORDER — DIAZEPAM 5 MG/1
5 TABLET ORAL EVERY 6 HOURS SCHEDULED
Qty: 20 TABLET | Refills: 0 | Status: SHIPPED | OUTPATIENT
Start: 2018-07-20 | End: 2018-07-30

## 2018-07-20 RX ORDER — SENNOSIDES 8.6 MG
1 TABLET ORAL
Status: DISCONTINUED | OUTPATIENT
Start: 2018-07-20 | End: 2018-07-20 | Stop reason: HOSPADM

## 2018-07-20 RX ORDER — HYDROMORPHONE HYDROCHLORIDE 2 MG/1
2-4 TABLET ORAL EVERY 4 HOURS PRN
Qty: 36 TABLET | Refills: 0 | Status: SHIPPED | OUTPATIENT
Start: 2018-07-20 | End: 2018-07-30

## 2018-07-20 RX ORDER — SENNOSIDES 8.6 MG
1 TABLET ORAL
Qty: 120 EACH | Refills: 0
Start: 2018-07-20

## 2018-07-20 RX ORDER — POTASSIUM CHLORIDE 20 MEQ/1
40 TABLET, EXTENDED RELEASE ORAL EVERY 4 HOURS
Status: DISCONTINUED | OUTPATIENT
Start: 2018-07-20 | End: 2018-07-20

## 2018-07-20 RX ADMIN — DIAZEPAM 5 MG: 5 TABLET ORAL at 05:40

## 2018-07-20 RX ADMIN — DIAZEPAM 5 MG: 5 TABLET ORAL at 11:27

## 2018-07-20 RX ADMIN — HYDROMORPHONE HYDROCHLORIDE 4 MG: 2 TABLET ORAL at 05:40

## 2018-07-20 RX ADMIN — HYDROMORPHONE HYDROCHLORIDE 4 MG: 2 TABLET ORAL at 10:21

## 2018-07-20 RX ADMIN — POTASSIUM CHLORIDE 40 MEQ: 1500 TABLET, EXTENDED RELEASE ORAL at 13:12

## 2018-07-20 RX ADMIN — ACETAMINOPHEN 975 MG: 325 TABLET, FILM COATED ORAL at 13:12

## 2018-07-20 RX ADMIN — PANTOPRAZOLE SODIUM 40 MG: 40 TABLET, DELAYED RELEASE ORAL at 05:44

## 2018-07-20 RX ADMIN — DIAZEPAM 5 MG: 5 TABLET ORAL at 00:51

## 2018-07-20 RX ADMIN — HYDROMORPHONE HYDROCHLORIDE 4 MG: 2 TABLET ORAL at 00:05

## 2018-07-20 RX ADMIN — SODIUM CHLORIDE, SODIUM ACETATE ANHYDROUS, SODIUM GLUCONATE, POTASSIUM CHLORIDE, AND MAGNESIUM CHLORIDE 85 ML/HR: 526; 222; 502; 37; 30 INJECTION, SOLUTION INTRAVENOUS at 05:45

## 2018-07-20 RX ADMIN — DOCUSATE SODIUM 100 MG: 100 CAPSULE, LIQUID FILLED ORAL at 13:12

## 2018-07-20 RX ADMIN — BACITRACIN ZINC 1 LARGE APPLICATION: 500 OINTMENT TOPICAL at 00:05

## 2018-07-20 RX ADMIN — ACETAMINOPHEN 975 MG: 325 TABLET, FILM COATED ORAL at 05:40

## 2018-07-20 RX ADMIN — BACITRACIN ZINC 1 LARGE APPLICATION: 500 OINTMENT TOPICAL at 08:31

## 2018-07-20 RX ADMIN — LIDOCAINE 2 PATCH: 50 PATCH CUTANEOUS at 08:22

## 2018-07-20 NOTE — ASSESSMENT & PLAN NOTE
- Continue OT/PT   - Encourage OOB/Ambulation   -  Stable for discharge on 07/20/2018  Outpatient follow-up in the trauma clinic

## 2018-07-20 NOTE — ASSESSMENT & PLAN NOTE
- Continue multimodal pain management  Pain contract reviewed and signed prior to discharge  - Aggressive pulmonary toilet  - Incentive spirometery  - Stable for discharge on 07/20/2018 with outpatient follow-up in the trauma clinic in 2-3 weeks

## 2018-07-20 NOTE — ASSESSMENT & PLAN NOTE
- Orthopedic surgery consulted, appreciate recommendations  - Splint in place   - LAQUITA RAZO hand in splint  Keep splint clean and dry   - Stable for discharge on 07/20/2018 per Orthopedics  Anticipate surgery on 7/24/18  Can be scheduled as an outpatient

## 2018-07-20 NOTE — PHYSICAL THERAPY NOTE
Physical Therapy Progress Note     07/20/18 1148   Pain Assessment   Pain Assessment FLACC   Pain Rating: FLACC (Rest) - Face 0   Pain Rating: FLACC (Rest) - Legs 0   Pain Rating: FLACC (Rest) - Activity 0   Pain Rating: FLACC (Rest) - Cry 1   Pain Rating: FLACC (Rest) - Consolability 0   Score: FLACC (Rest) 1   Pain Rating: FLACC (Activity) - Face 1   Pain Rating: FLACC (Activity) - Legs 1   Pain Rating: FLACC (Activity) - Activity 1   Pain Rating: FLACC (Activity) - Cry 1   Pain Rating: FLACC (Activity) - Consolability 1   Score: FLACC (Activity) 5   Restrictions/Precautions   RUE Weight Bearing Per Order NWB   Other Precautions WBS; Fall Risk;Multiple lines;Telemetry   General   Chart Reviewed Yes   Family/Caregiver Present Yes   Subjective   Subjective Pt stated he felt much better today, and felt comfortable using SPC with ambulation  Bed Mobility   Supine to Sit 5  Supervision   Additional items Assist x 1; Increased time required;Verbal cues   Transfers   Sit to Stand 5  Supervision   Additional items Assist x 1;Verbal cues; Increased time required   Stand to Sit 5  Supervision   Additional items Assist x 1; Increased time required   Stand pivot 5  Supervision   Additional items Assist x 1; Increased time required   Toilet transfer 5  Supervision   Additional items Assist x 1; Increased time required;Verbal cues   Ambulation/Elevation   Gait pattern Foward flexed; Short stride; Excessively slow;Narrow HOLLY   Gait Assistance 4  Minimal assist   Additional items Assist x 1   Assistive Device [facility]  (IV pole, then [facility])   Distance 190' x 2, 130' x 2, 10' x 2   Stair Management Assistance 4  Minimal assist   Additional items Assist x 1   Stair Management Technique One rail L;Step to pattern; Foreward   Number of Stairs 13  (4 steps x 1, 3 steps x 3)   Balance   Static Sitting Good   Static Standing Fair   Ambulatory Fair -   Endurance Deficit   Endurance Deficit No   Activity Tolerance   Activity Tolerance Patient tolerated treatment well   Nurse Made Aware Yes   Assessment   Prognosis Good   Problem List Decreased range of motion;Decreased endurance; Impaired balance;Decreased mobility; Decreased coordination;Decreased safety awareness;Pain;Orthopedic restrictions; Impaired judgement   Assessment Pt continues to demonstrate improved mobility today, ambulatig beyond household distance without seated rest and no observable LOB  Pt also stated he feels more steady on his feet today  Pt then ambulated safely on steps with min A for guarding  No LOB of observable dyspnea noted  Trialed pt with [facility] today, and he felt comfortable with it, stating it helped reduce pain  Demonstrated good sequencing and safety with this AD, making it most appropriate at this time due to his current ambulatory status  Pt will continue to require instruction for posture and improving gait sequencing to improve overall ambulatory mobility  Pt has demonstrated sufficient progress to return home with increased family support and asssitance with ADLs and standing activities  Pt will continue to benefit from therapy services to improve balance, mobility, activity tolerance, and progress pt with ambulation to maximize independence  Barriers to Discharge None   Goals   Patient Goals to go home   STG Expiration Date 07/28/18   Short Term Goal #1 as per initial eval:  Pt will be mod(I) with rolling R and L for ease with OOB transfer  Pt will be mod(I) with supine<->sit transfer to facilitate OOB  Pt will be mod(I) with sit<->stand to promote (I) with toileting  Pt will be mod(I) with ambualtion of household distances (appx 50') to reduce caregiver burden  Pt will be S with community distance ambulaiton using least restrictive AD to increase tolerance to activity  Pt will be S ascending and descending full flight stairs to gain access into home   Pt will particiapte in HEP consisitng of balance, strength, ROM and coordinaiton tasks to increase activitiy, improve (I) and decrease pain  Treatment Day 2   Plan   Treatment/Interventions Functional transfer training;LE strengthening/ROM; Elevations; Therapeutic exercise; Endurance training;Patient/family training;Equipment eval/education; Bed mobility;Gait training   Progress Progressing toward goals   PT Frequency (3-6x/week)   Recommendation   Recommendation Home with family support; Outpatient PT   PT - OK to Discharge Yes     Navid Camarena, PTA

## 2018-07-20 NOTE — PROGRESS NOTES
Progress Note Akash Hansene 1990, 34 y o  male MRN: [id number]    Unit/Bed#: [facility] 903-01 Encounter: [id number]    Primary Care Provider: No primary care provider on file  Date and time admitted to hospital: 7/17/2018  3:39 PM        Nausea & vomiting   Assessment & Plan    Pt notes nausea has significantly improved  Will advance from clear liquids to Regular diet  Encouraged pt to take in protein rich foods as he is able  Antiemetics ordered  Will d/c IVF given BMP - no evidence of IVANIA/dehydration  Replete lytes PRN        Closed nondisplaced fracture of proximal phalanx of right index finger   Assessment & Plan    Orthopedic surgery consulted, appreciate recommendations  Splint in place  Pt NVI  NWB RUE hand in splint   Keep splint clean and dry   Anticipate surgery on 7/24/18  Can be scheduled as an outpatient  Closed fracture of transverse process of thoracic vertebra [facility])   Assessment & Plan    Continue OT/PT  Awaiting updated discharge recommendations  Encourage OOB/Ambulation  Dispo planning          Closed fracture of multiple ribs of both sides   Assessment & Plan    See above        * Closed fracture of manubrium   Assessment & Plan    Continue multimodal pain management  Aggressive pulmonary toilet  Incentive spirometery  Cardiology consulted, appreciate recs  ECHO reviewed  Mild TR  Otherwise WNL  No further work up needed  SCDs/Lovenox          Dispo: Anticipate d/c within 24-48 hours        Subjective/Objective     Subjective: Pt seen and examined  Pt notes that he is having an easier time working with therapy and his pain is better controlled  Pt denies CP or SOB  Would like to eat regular diet  Reports improvement in nausea  Last emesis at 3 am yesterday    Objective:     Meds/Allergies   No prescriptions prior to admission  Vitals: Blood pressure 115/60, pulse 60, temperature 98 °F (36 7 °C), temperature source Oral, resp   rate 16, height 6' 1" (1 854 m), weight 81 6 kg (180 lb), SpO2 98 %  Body mass index is 23 75 kg/m²  SpO2: SpO2: 98 %    ABG: No results found for: PHART, QTR5GLW, PO2ART, AZV7FYD, M2NWLKDI, BEART, SOURCE      Intake/Output Summary (Last 24 hours) at 07/20/18 1213  Last data filed at 07/20/18 0900   Gross per 24 hour   Intake          3266 42 ml   Output              500 ml   Net          2766 42 ml       Invasive Devices     Peripheral Intravenous Line            Peripheral IV 07/17/18 Left Antecubital 2 days                Nutrition/GI Proph/Bowel Reg: Regular diet/colace/senna    Physical Exam:   GENERAL APPEARANCE: NAD  HEENT: PERRLA, EOMI  CV: Regular Rate, normotensive, nrml heart sounds  LUNGS: clear to ascultation bilaterally, no wheezing, no rhonchi, no rales  ABD: soft, no distension, no rebound, no guarding  EXT: No edema  NEURO: Alert and oriented x 3, no focal deficits  SKIN: Scattered abrasions, road rash - both resolving   No weeping wounds    Lab Results:   BMP/CMP:   Lab Results   Component Value Date     07/20/2018    K 3 7 07/20/2018     07/20/2018    CO2 29 07/20/2018    ANIONGAP 5 07/20/2018    BUN 5 07/20/2018    CREATININE 0 75 07/20/2018    GLUCOSE 102 07/20/2018    CALCIUM 8 9 07/20/2018    EGFR 127 07/20/2018    and CBC:   Lab Results   Component Value Date    WBC 6 09 07/20/2018    HGB 11 9 (L) 07/20/2018    HCT 35 7 (L) 07/20/2018    MCV 85 07/20/2018     07/20/2018    MCH 28 5 07/20/2018    MCHC 33 3 07/20/2018    RDW 12 5 07/20/2018    MPV 9 0 07/20/2018     Imaging/EKG Studies: na  Other Studies: na  VTE Prophylaxis: Lovenox

## 2018-07-20 NOTE — PLAN OF CARE
Problem: PHYSICAL THERAPY ADULT  Goal: Performs mobility at highest level of function for planned discharge setting  See evaluation for individualized goals  Treatment/Interventions: Functional transfer training, LE strengthening/ROM, Elevations, Therapeutic exercise, Endurance training, Cognitive reorientation, Patient/family training, Equipment eval/education, Bed mobility, Gait training, Compensatory technique education, Spoke to nursing, Spoke to case management, Spoke to advanced practitioner, OT          See flowsheet documentation for full assessment, interventions and recommendations  Outcome: Progressing  Prognosis: Good  Problem List: Decreased range of motion, Decreased endurance, Impaired balance, Decreased mobility, Decreased coordination, Decreased safety awareness, Pain, Orthopedic restrictions, Impaired judgement  Assessment: Pt continues to demonstrate improved mobility today, ambulatig beyond household distance without seated rest and no observable LOB  Pt also stated he feels more steady on his feet today  Pt then ambulated safely on steps with min A for guarding  No LOB of observable dyspnea noted  Trialed pt with [facility] today, and he felt comfortable with it, stating it helped reduce pain  Demonstrated good sequencing and safety with this AD, making it most appropriate at this time due to his current ambulatory status  Pt will continue to require instruction for posture and improving gait sequencing to improve overall ambulatory mobility  Pt has demonstrated sufficient progress to return home with increased family support and asssitance with ADLs and standing activities  Pt will continue to benefit from therapy services to improve balance, mobility, activity tolerance, and progress pt with ambulation to maximize independence    Barriers to Discharge: None  Barriers to Discharge Comments: multiple stairs within home   Recommendation: Home with family support, Outpatient PT     PT - OK to Discharge: Yes    See flowsheet documentation for full assessment

## 2018-07-20 NOTE — ASSESSMENT & PLAN NOTE
Orthopedic surgery consulted, appreciate recommendations  Splint in place  Pt NVI  LAQUITA RAZO hand in splint   Keep splint clean and dry   Anticipate surgery on 7/24/18  Can be scheduled as an outpatient

## 2018-07-20 NOTE — DISCHARGE INSTRUCTIONS
Traumatic Rib Fracture Discharge Instructions: Activity:  - Walking and normal light activities are encouraged  Normal daily activities including climbing steps are okay  - Avoid lifting greater than 10 pounds, any strenuous activities and/or exercise, and contact sports until cleared by the trauma service  - Continue using the incentive spirometer at least 10 times every hour while awake  Return to work:    - You may return to work once you are cleared by the trauma service  Medications:    - You may resume all of your regular medications, including blood thinners and aspirin, after going home unless otherwise instructed  Please refer to your discharge medication list for further details  - Please take the pain medications as directed  - You are encouraged to use non-narcotic pain medications first and whenever possible  Reserve the use of narcotic pain medication for moderate to severe pain not controlled by non-narcotic medications   - No driving while taking narcotic pain medications  - You may become constipated, especially if taking pain medications  You may take any over the counter stool softeners or laxatives as needed  Examples: Milk of Magnesia, Colace, Senna  Additional Instructions:  - If you have any questions or concerns after discharge please call the office   - Call office or return to ER if fever greater than 101, chills, worsening/uncontrollable pain, develop productive cough, increasing shortness of breath, and/or difficulty breathing  Discharge Instructions - Orthopedics  Reina Dent 34 y o  male MRN: [id number]  Unit/Bed#: [facility] 903-01    Weight Bearing Status:                                           Non weight bearing right upper extremity    DVT prophylaxis:  Per primary    Pain:  Continue analgesics as directed    Dressing Instructions:   Keep dressing clean, dry and intact until follow up appointment      PT/OT:  Continue PT/OT on outpatient basis as directed    Appt Instructions: You are currently scheduled for surgery to fix your R index finger on 7/24/2018  Contact the office sooner if you experience any increased numbness/tingling in the extremities        Miscellaneous:  None

## 2018-07-20 NOTE — OCCUPATIONAL THERAPY NOTE
Occupational Therapy Treatment Note:       07/20/18 0940   Restrictions/Precautions   RUE Weight Bearing Per Order NWB   Braces or Orthoses Splint  (r ue)   Other Precautions Fall Risk   Pain Assessment   Pain Assessment No/denies pain   Pain Score No Pain   ADL   Grooming Assistance 4  Minimal Assistance   Grooming Deficit Wash/dry face;Brushing hair  (washing/ drying and combing  hair)   UB Bathing Assistance 3  Moderate Assistance   LB Bathing Assistance 4  Minimal Assistance   LB Bathing Deficit Left lower leg including foot;Right lower leg including foot   LB Dressing Assistance 3  Moderate Assistance   LB Dressing Deficit Don/doff L sock; Don/doff R sock   LB Dressing Comments sba pants,  completed pulling socks over heels after ot initaited  Toileting Comments sba clothing management in stance   Transfers   Sit to Stand 5  Supervision   Stand to Sit 5  Supervision   Stand pivot 5  Supervision   Additional items (to from bed, recliner and low armless chair)   Functional Mobility   Functional Mobility 5  Supervision   Additional items (no device )   Cognition   Comments minimal vc's for slight impulsiveness with  transfers   Activity Tolerance   Activity Tolerance Patient tolerated treatment well   Assessment   Assessment pt participated in am ot session and was seen focusing on am care, grooming,  activity and standing tolerence, bed mobility and overall safety  pt is  reports he is limited by position of iv in inner elbow  pt requiried cues to straighten trunk if possible during standing and mobility, pt with  hunched posture at times when in stance  pt required asst to manage multiple lines ie telemetry, iv, masimo  pt with bangages in r axillary area/ chest, he reprots from road rash  pt was able to trnasfer and walk several times to from bathroom with slow pace but steady with supervision without a d  or ue support  pt required overall mod asst for ub am care at this time    pt was able to reach legs via leg cross with encouragement inorder to casimiro pants with sba  while seated  pt was able to manage clothing over hips and brush teeth in stance after set up   pt was cooperative and motivated for session  pt reports being limited by feelings of depression and loss of  roles at this time  pts s/o was present and very supportive  pt did not c/o nausea nor had dizziness or vomiting this session  pt with clear leiqud breakfast which pt was noted not to eat any of  pt reports limited sleep last night  pt reoprted feeling better after bathing / dressing  pt donned street clothing  Plan   Treatment Interventions ADL retraining; Activityengagement; Functional transfer training;Patient/family training;Cognitive reorientation;Equipment evaluation/education; Endurance training   Goal Expiration Date 07/28/18   Treatment Day 2   OT Frequency 3-5x/wk   Recommendation   OT Discharge Recommendation Other (Comment)  (home vs rehab pending progress   s/o will be c pt overnight)   Barthel Index   Feeding 5   Bathing 0   Grooming Score 5   Dressing Score 5   Bladder Score 10   Bowels Score 10   Toilet Use Score 5   Transfers (Bed/Chair) Score 10   Mobility (Level Surface) Score 0   Stairs Score 0   Barthel Index Score 50   Modified [place] Scale   Modified Bo Scale 4   April A FREDERIC Benson

## 2018-07-20 NOTE — DISCHARGE SUMMARY
Discharge- Clemencia Ortega 1990, 34 y o  male MRN: [id number]    [facility] Encounter: [id number]    Primary Care Provider: No primary care provider on file  Date and time admitted to hospital: 7/17/2018  3:39 PM        No new Assessment & Plan notes have been filed under this hospital service since the last note was generated  Service: Surgery-General        Discharge Summary - Trauma Service   Clemencia Ortega 34 y o  male MRN: [id number]  Unit/Bed#: [facility] 903-01 Encounter: [id number]    Admission Date: 7/17/2018     Discharge Date: 7/27/2018    Admitting Diagnosis: Multiple injuries [T07  XXXA]  Closed nondisplaced fracture of proximal phalanx of right index finger, initial encounter [S62 640A]    Discharge Diagnosis: See above  Attending and Service: Dr Merritt Mack  Consulting Physician(s):     1  [facility]  2  [facility]  Imaging and Procedures Performed:     CT scan of the head on 07/17/2018 showed no acute intracranial abnormality  CT scan of the cervical spine on 07/17/2018 showed fractures of the left T1 and T2 transverse processes  Fractures of the left 1st and 2nd ribs  CT scan of the chest on 07/17/2018 showed fracture of the manubrium sternum  Small pericardial effusion  Concern for hemopericardium  Fractures of the right anterior 1st 2nd and 3rd ribs  Fractures of the left 1st and 2nd posterior ribs  Fractures of the left T1 and T2 transverse processes  Bibasilar atelectasis  CT scan of the chest, abdomen, and pelvis on 07/17/2018 showed subsegmental atelectasis in both lower lobes  Small left pleural effusion  Trace amount of pericardial fluid, less evident now than on the CT from earlier today  Fracture of the sternal manubrium  Fractures of the right 1st through 4th ribs and the left 1st and 2nd ribs  Fractures of the left transverse processes of T1 and T2  No abnormality in the abdomen or pelvis      Pelvic x-ray on 07/17/2018 showed normal bony pelvis  Right hand x-ray on 07/17/2018 showed no significant change in alignment of comminuted, intra-articular fracture base of the 2nd proximal phalanx  Right finger x-rays on 07/17/2018 showed comminuted intra-articular fracture in the proximal aspect of the proximal phalanx of the right index finger  Chest x-ray on 07/18/2018 showed trace bibasilar atelectatic changes and left effusion  Otherwise clear lungs  No pneumothorax  Hospital Course: Eliot Benedict is a 61-year-old ***    On discharge, the patient is instructed to follow-up with the patient's primary care provider in the next one month to review the events of the patient's recent hospitalization  The patient is instructed to follow-up in the Trauma Clinic as scheduled on 8/7/2018 at 1:45 PM  The patient may resume a regular diet  The patient should follow the provided trauma service and orthopedic service discharge instructions  Condition at Discharge: good     Discharge instructions/Information to patient and family:   See after visit summary for information provided to patient and family  Provisions for Follow-Up Care:  See after visit summary for information related to follow-up care and any pertinent home health orders  Disposition: See After Visit Summary for discharge disposition information  Planned Readmission: No    Discharge Statement   I spent 25 minutes discharging the patient  This time was spent on the day of discharge  I had direct contact with the patient on the day of discharge  Additional documentation is required if more than 30 minutes were spent on discharge  Discharge Medications:  See after visit summary for reconciled discharge medications provided to patient and family        Brian Sandra PA-C  7/27/2018  4:59 PM

## 2018-07-20 NOTE — PLAN OF CARE
DISCHARGE PLANNING     Discharge to home or other facility with appropriate resources Progressing        INFECTION - ADULT     Absence or prevention of progression during hospitalization Progressing     Absence of fever/infection during neutropenic period Progressing        PAIN - ADULT     Verbalizes/displays adequate comfort level or baseline comfort level Progressing        Potential for Falls     Patient will remain free of falls Progressing        RESPIRATORY - ADULT     Achieves optimal ventilation and oxygenation Progressing        SAFETY ADULT     Patient will remain free of falls Progressing     Maintain or return to baseline ADL function Progressing     Maintain or return mobility status to optimal level Progressing        SKIN/TISSUE INTEGRITY - ADULT     Skin integrity remains intact Progressing     Incision(s), wounds(s) or drain site(s) healing without S/S of infection Progressing     Oral mucous membranes remain intact Progressing

## 2018-07-20 NOTE — PLAN OF CARE
Problem: OCCUPATIONAL THERAPY ADULT  Goal: Performs self-care activities at highest level of function for planned discharge setting  See evaluation for individualized goals  Treatment Interventions: ADL retraining, Functional transfer training, Endurance training, Cognitive reorientation, Patient/family training, Equipment evaluation/education, Compensatory technique education, Energy conservation, Activityengagement          See flowsheet documentation for full assessment, interventions and recommendations  Outcome: Progressing  Limitation: Decreased ADL status, Decreased Safe judgement during ADL, Decreased cognition, Decreased endurance, Decreased self-care trans, Decreased high-level ADLs  Prognosis: Good  Assessment: pt participated in am ot session and was seen focusing on am care, grooming,  activity and standing tolerence, bed mobility and overall safety  pt is  reports he is limited by position of iv in inner elbow  pt requiried cues to straighten trunk if possible during standing and mobility, pt with  hunched posture at times when in stance  pt required asst to manage multiple lines ie telemetry, iv, masimo  pt with bangages in r axillary area/ chest, he reprots from road rash  pt was able to trnasfer and walk several times to from bathroom with slow pace but steady with supervision without a d  or ue support  pt required overall mod asst for ub am care at this time  pt was able to reach legs via leg cross with encouragement inorder to casimiro pants with sba  while seated  pt was able to manage clothing over hips and brush teeth in stance after set up   pt was cooperative and motivated for session  pt reports being limited by feelings of depression and loss of  roles at this time  pts s/o was present and very supportive  pt did not c/o nausea nor had dizziness or vomiting this session  pt with clear leiqud breakfast which pt was noted not to eat any of  pt reports limited sleep last night   pt reoprted feeling better after bathing / dressing  pt donned street clothing  OT Discharge Recommendation: Other (Comment) (home vs rehab pending progress   s/o will be c pt overnight)  OT - OK to Discharge: Yes (when medically cleared )  April A FREDERIC Benson

## 2018-07-20 NOTE — ASSESSMENT & PLAN NOTE
Pt notes nausea has significantly improved    Will advance from clear liquids to Regular diet  Encouraged pt to take in protein rich foods as he is able  Antiemetics ordered  Will d/c IVF given BMP - no evidence of IVANIA/dehydration  Replete lytes PRN

## 2018-07-20 NOTE — ASSESSMENT & PLAN NOTE
- Pt notes nausea has significantly improved  - Continue diet as tolerated  Encouraged pt to take in protein rich foods as he is able

## 2018-07-20 NOTE — SOCIAL WORK
Pt greatly improved with therapy  Pt will now d/c home with outpatient therapy and a SPC   Pt has his cane and CM gave him the script for OP therapy

## 2018-07-23 LAB
BACTERIA BLD CULT: NORMAL
BACTERIA BLD CULT: NORMAL

## 2018-07-24 ENCOUNTER — ANESTHESIA (OUTPATIENT)
Dept: PERIOP | Facility: HOSPITAL | Age: 28
End: 2018-07-24
Payer: COMMERCIAL

## 2018-07-24 ENCOUNTER — HOSPITAL ENCOUNTER (OUTPATIENT)
Dept: RADIOLOGY | Facility: HOSPITAL | Age: 28
Setting detail: OUTPATIENT SURGERY
Discharge: HOME | End: 2018-07-24
Payer: COMMERCIAL

## 2018-07-24 ENCOUNTER — ANESTHESIA EVENT (OUTPATIENT)
Dept: PERIOP | Facility: HOSPITAL | Age: 28
End: 2018-07-24
Payer: COMMERCIAL

## 2018-07-24 ENCOUNTER — HOSPITAL ENCOUNTER (OUTPATIENT)
Facility: HOSPITAL | Age: 28
Setting detail: OUTPATIENT SURGERY
Discharge: HOME | End: 2018-07-24
Attending: ORTHOPAEDIC SURGERY | Admitting: ORTHOPAEDIC SURGERY
Payer: COMMERCIAL

## 2018-07-24 VITALS
OXYGEN SATURATION: 98 % | SYSTOLIC BLOOD PRESSURE: 137 MMHG | WEIGHT: 180 LBS | DIASTOLIC BLOOD PRESSURE: 59 MMHG | BODY MASS INDEX: 23.86 KG/M2 | TEMPERATURE: 97.8 F | RESPIRATION RATE: 18 BRPM | HEIGHT: 73 IN | HEART RATE: 77 BPM

## 2018-07-24 DIAGNOSIS — S62.640A CLOSED NONDISPLACED FRACTURE OF PROXIMAL PHALANX OF RIGHT INDEX FINGER, INITIAL ENCOUNTER: ICD-10-CM

## 2018-07-24 DIAGNOSIS — S62.610D CLOSED DISPLACED FRACTURE OF PROXIMAL PHALANX OF RIGHT INDEX FINGER WITH ROUTINE HEALING, SUBSEQUENT ENCOUNTER: Primary | ICD-10-CM

## 2018-07-24 PROBLEM — S62.610A CLOSED DISPLACED FRACTURE OF PROXIMAL PHALANX OF RIGHT INDEX FINGER: Status: ACTIVE | Noted: 2018-07-17

## 2018-07-24 PROCEDURE — 73120 X-RAY EXAM OF HAND: CPT

## 2018-07-24 PROCEDURE — C1713 ANCHOR/SCREW BN/BN,TIS/BN: HCPCS | Performed by: ORTHOPAEDIC SURGERY

## 2018-07-24 PROCEDURE — 26746 TREAT FINGER FRACTURE EACH: CPT | Performed by: ORTHOPAEDIC SURGERY

## 2018-07-24 DEVICE — 1.5MM VA-LCKNG SCREW SLF-TPNG WITH T4 STARDRIVE RECESS 10MM: Type: IMPLANTABLE DEVICE | Site: HAND | Status: FUNCTIONAL

## 2018-07-24 DEVICE — 1.5MM VAL T-PLATE 3 HOLES HD-7 HOLES SHAFT: Type: IMPLANTABLE DEVICE | Site: HAND | Status: FUNCTIONAL

## 2018-07-24 DEVICE — 1.5MM CORTEX SCREW SLF-TPNG WITH T4 STARDRIVE RECESS 8MM: Type: IMPLANTABLE DEVICE | Site: HAND | Status: FUNCTIONAL

## 2018-07-24 DEVICE — 1.5MM VA-LCKNG SCREW SLF-TPNG WITH T4 STARDRIVE RECESS 12MM: Type: IMPLANTABLE DEVICE | Site: HAND | Status: FUNCTIONAL

## 2018-07-24 DEVICE — 1.5MM VA-LCKNG SCREW SLF-TPNG WITH T4 STARDRIVE RECESS 11MM: Type: IMPLANTABLE DEVICE | Site: HAND | Status: FUNCTIONAL

## 2018-07-24 DEVICE — 1.5MM VA-LCKNG SCREW SLF-TPNG WITH T4 STARDRIVE RECESS 14MM: Type: IMPLANTABLE DEVICE | Site: HAND | Status: FUNCTIONAL

## 2018-07-24 RX ORDER — HYDROCODONE BITARTRATE AND ACETAMINOPHEN 5; 325 MG/1; MG/1
1 TABLET ORAL ONCE AS NEEDED
Status: DISCONTINUED | OUTPATIENT
Start: 2018-07-24 | End: 2018-07-24 | Stop reason: HOSPADM

## 2018-07-24 RX ORDER — MAGNESIUM HYDROXIDE 1200 MG/15ML
LIQUID ORAL AS NEEDED
Status: DISCONTINUED | OUTPATIENT
Start: 2018-07-24 | End: 2018-07-24 | Stop reason: HOSPADM

## 2018-07-24 RX ORDER — HYDROCODONE BITARTRATE AND ACETAMINOPHEN 5; 325 MG/1; MG/1
1 TABLET ORAL EVERY 6 HOURS PRN
Qty: 20 TABLET | Refills: 0 | Status: CANCELLED | OUTPATIENT
Start: 2018-07-24

## 2018-07-24 RX ORDER — SENNOSIDES 8.6 MG
650 CAPSULE ORAL EVERY 8 HOURS PRN
Qty: 15 TABLET | Refills: 0 | Status: SHIPPED | OUTPATIENT
Start: 2018-07-24 | End: 2018-07-29

## 2018-07-24 RX ORDER — ONDANSETRON 2 MG/ML
INJECTION INTRAMUSCULAR; INTRAVENOUS AS NEEDED
Status: DISCONTINUED | OUTPATIENT
Start: 2018-07-24 | End: 2018-07-24 | Stop reason: SURG

## 2018-07-24 RX ORDER — PROPOFOL 10 MG/ML
INJECTION, EMULSION INTRAVENOUS AS NEEDED
Status: DISCONTINUED | OUTPATIENT
Start: 2018-07-24 | End: 2018-07-24 | Stop reason: SURG

## 2018-07-24 RX ORDER — METOCLOPRAMIDE HYDROCHLORIDE 5 MG/ML
10 INJECTION INTRAMUSCULAR; INTRAVENOUS ONCE AS NEEDED
Status: DISCONTINUED | OUTPATIENT
Start: 2018-07-24 | End: 2018-07-24 | Stop reason: HOSPADM

## 2018-07-24 RX ORDER — MIDAZOLAM HYDROCHLORIDE 1 MG/ML
INJECTION INTRAMUSCULAR; INTRAVENOUS AS NEEDED
Status: DISCONTINUED | OUTPATIENT
Start: 2018-07-24 | End: 2018-07-24 | Stop reason: SURG

## 2018-07-24 RX ORDER — FENTANYL CITRATE 50 UG/ML
INJECTION, SOLUTION INTRAMUSCULAR; INTRAVENOUS AS NEEDED
Status: DISCONTINUED | OUTPATIENT
Start: 2018-07-24 | End: 2018-07-24 | Stop reason: SURG

## 2018-07-24 RX ORDER — SENNOSIDES 8.6 MG
650 CAPSULE ORAL EVERY 8 HOURS PRN
Qty: 15 TABLET | Refills: 0 | Status: CANCELLED | OUTPATIENT
Start: 2018-07-24 | End: 2018-07-29

## 2018-07-24 RX ORDER — SODIUM CHLORIDE, SODIUM LACTATE, POTASSIUM CHLORIDE, CALCIUM CHLORIDE 600; 310; 30; 20 MG/100ML; MG/100ML; MG/100ML; MG/100ML
INJECTION, SOLUTION INTRAVENOUS CONTINUOUS PRN
Status: DISCONTINUED | OUTPATIENT
Start: 2018-07-24 | End: 2018-07-24 | Stop reason: SURG

## 2018-07-24 RX ORDER — ONDANSETRON 2 MG/ML
4 INJECTION INTRAMUSCULAR; INTRAVENOUS ONCE AS NEEDED
Status: COMPLETED | OUTPATIENT
Start: 2018-07-24 | End: 2018-07-24

## 2018-07-24 RX ORDER — SODIUM CHLORIDE, SODIUM LACTATE, POTASSIUM CHLORIDE, CALCIUM CHLORIDE 600; 310; 30; 20 MG/100ML; MG/100ML; MG/100ML; MG/100ML
75 INJECTION, SOLUTION INTRAVENOUS CONTINUOUS
Status: DISCONTINUED | OUTPATIENT
Start: 2018-07-24 | End: 2018-07-24 | Stop reason: HOSPADM

## 2018-07-24 RX ORDER — HYDROCODONE BITARTRATE AND ACETAMINOPHEN 5; 325 MG/1; MG/1
1 TABLET ORAL EVERY 6 HOURS PRN
Qty: 20 TABLET | Refills: 0 | Status: SHIPPED | OUTPATIENT
Start: 2018-07-24

## 2018-07-24 RX ORDER — HYDROMORPHONE HYDROCHLORIDE 2 MG/ML
INJECTION, SOLUTION INTRAMUSCULAR; INTRAVENOUS; SUBCUTANEOUS AS NEEDED
Status: DISCONTINUED | OUTPATIENT
Start: 2018-07-24 | End: 2018-07-24 | Stop reason: SURG

## 2018-07-24 RX ORDER — LIDOCAINE HYDROCHLORIDE 10 MG/ML
INJECTION, SOLUTION INFILTRATION; PERINEURAL AS NEEDED
Status: DISCONTINUED | OUTPATIENT
Start: 2018-07-24 | End: 2018-07-24 | Stop reason: SURG

## 2018-07-24 RX ADMIN — HYDROMORPHONE HYDROCHLORIDE 0.5 MG: 2 INJECTION, SOLUTION INTRAMUSCULAR; INTRAVENOUS; SUBCUTANEOUS at 15:26

## 2018-07-24 RX ADMIN — PROPOFOL 200 MG: 10 INJECTION, EMULSION INTRAVENOUS at 14:22

## 2018-07-24 RX ADMIN — ONDANSETRON 4 MG: 2 INJECTION INTRAMUSCULAR; INTRAVENOUS at 16:10

## 2018-07-24 RX ADMIN — HYDROMORPHONE HYDROCHLORIDE 1 MG: 1 INJECTION, SOLUTION INTRAMUSCULAR; INTRAVENOUS; SUBCUTANEOUS at 12:12

## 2018-07-24 RX ADMIN — FENTANYL CITRATE 100 MCG: 50 INJECTION, SOLUTION INTRAMUSCULAR; INTRAVENOUS at 14:21

## 2018-07-24 RX ADMIN — HYDROMORPHONE HYDROCHLORIDE 0.4 MG: 1 INJECTION, SOLUTION INTRAMUSCULAR; INTRAVENOUS; SUBCUTANEOUS at 16:26

## 2018-07-24 RX ADMIN — DEXAMETHASONE SODIUM PHOSPHATE 10 MG: 10 INJECTION, SOLUTION INTRAMUSCULAR; INTRAVENOUS at 14:41

## 2018-07-24 RX ADMIN — HYDROMORPHONE HYDROCHLORIDE 0.4 MG: 1 INJECTION, SOLUTION INTRAMUSCULAR; INTRAVENOUS; SUBCUTANEOUS at 16:51

## 2018-07-24 RX ADMIN — ONDANSETRON 4 MG: 2 INJECTION, SOLUTION INTRAMUSCULAR; INTRAVENOUS at 16:45

## 2018-07-24 RX ADMIN — MIDAZOLAM 2 MG: 1 INJECTION INTRAMUSCULAR; INTRAVENOUS at 14:21

## 2018-07-24 RX ADMIN — HYDROMORPHONE HYDROCHLORIDE 0.4 MG: 1 INJECTION, SOLUTION INTRAMUSCULAR; INTRAVENOUS; SUBCUTANEOUS at 16:33

## 2018-07-24 RX ADMIN — LIDOCAINE HYDROCHLORIDE 50 MG: 10 INJECTION, SOLUTION INFILTRATION; PERINEURAL at 14:22

## 2018-07-24 RX ADMIN — SODIUM CHLORIDE, SODIUM LACTATE, POTASSIUM CHLORIDE, AND CALCIUM CHLORIDE 75 ML/HR: .6; .31; .03; .02 INJECTION, SOLUTION INTRAVENOUS at 16:45

## 2018-07-24 RX ADMIN — CEFAZOLIN SODIUM 2000 MG: 2 SOLUTION INTRAVENOUS at 14:21

## 2018-07-24 RX ADMIN — SODIUM CHLORIDE, SODIUM LACTATE, POTASSIUM CHLORIDE, AND CALCIUM CHLORIDE: .6; .31; .03; .02 INJECTION, SOLUTION INTRAVENOUS at 14:00

## 2018-07-24 RX ADMIN — HYDROMORPHONE HYDROCHLORIDE 0.4 MG: 1 INJECTION, SOLUTION INTRAMUSCULAR; INTRAVENOUS; SUBCUTANEOUS at 17:00

## 2018-07-24 NOTE — ANESTHESIA POSTPROCEDURE EVALUATION
Post-Op Assessment Note      CV Status:  Stable    Mental Status:  Somnolent and lethargic    Hydration Status:  Stable    PONV Controlled:  None    Airway Patency:  Patent    Post Op Vitals Reviewed: Yes          Staff: Anesthesiologist, CRNA           BP   133/54   Temp   98 3   Pulse 94   Resp   16   SpO2   98
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
## 2018-07-24 NOTE — OP NOTE
OPERATIVE REPORT  PATIENT NAME: Casey Medellin  :  1990  MRN: [id number]  Pt Location: [facility]    SURGERY DATE: 18    Surgeon(s) and Role:     * Landen Magallon MD - Primary     * Ge Nevarez MD - Assisting    Pre-Op Diagnosis:  Closed nondisplaced fracture of proximal phalanx of right index finger, initial encounter [B50 640A]    Post-Op Diagnosis Codes:     * Closed displaced fracture of proximal phalanx of right index finger [S6 610A]    Procedure(s):  OPEN REDUCTION W/ INTERNAL FIXATION (ORIF) HAND, RIGHT INDEX FINGER INTRARTICULAR PROXIMAL PHALANX FRACTURE (Right)    Specimen(s):  * No orders in the log *    Estimated Blood Loss:   Minimal      Anesthesia Type:   General    Operative Indications: The patient has a history of right index finger intra-articular proximal phalanx fracture that was Closed, Angulated, Displaced, Comminuted and Intra-articular  The decision was made to bring the patient to the operating room for open reduction and internal fixation  Risks of the procedure were explained which include, but are not limited to bleeding; infection; damage to nerves, arteries,veins, tendons; scar; pain; need for reoperation; failure to give desired result; delayed union, malunion, nonunion; and risks of anaesthesia  All questions were answered to satisfaction and they were willing to proceed  Operative Findings:  Intra-articular proximal phalanx fracture right index finger with significant comminution and 4 articular fragments    Complications:   None    Procedure and Technique:  After the patient, site, and procedure were identified, the patient was brought into the operating room in a supine position  General anaesthesia and local medication were provided  A well padded tourniquet was applied to the extremity, set at 250 mmHg  The  right upper extremity was then prepped and drapped in a normal, sterile, orthopedic fashion      After the patient, site, and procedure were identified attention was turned towards the right hand  An Esmarch bandage was used to exsanguinate the limb and the tourniquet was inflated to 250 mm of mercury  A longitudinal incision along dorsal aspect of the index finger was then made  We dissected down through skin subcutaneous tissues maintaining hemostasis  Extensor tendon was then opened along its midline and a capsulotomy into the metacarpophalangeal joint was then made  We identified multiple fracture fragments  The articular surface was noted to be in 4 main fragments, there was 2 areas of central comminution, a large radial, and a large ulnar piece  There is also a separate shaft fragment  This point, fracture fragments of cleaned of all fibrin is material and a reduction of the articular surface was then performed and confirmed via direct visualization with intraoperative fluoroscopy  Two C-wires were then inserted to provisionally pin the joint surface  A Synthes variable angle 1 5 mm locking T-plate was then selected and was placed on the dorsal aspect of the finger  The radial and ulnar piece were secured utilizing a bicortical locking screws  The shaft fragment was then reduced with care taken to confirm correct rotation and correct composite fist formation  The plate was then secured shaft utilizing 3 bicortical nonlocking screws  At the completion, AP and lateral x-rays confirmed good plate placement and good reduction of the articular surface  There was no evidence of malrotation and full finger range of motion  The tourniquet was then deflated  At the completion of the procedure, hemostasis was obtained with cautery and direct pressure  The wounds were copiously irrigated with sterile solution  The wounds were closed with Vicryl and Prolene  Sterile dressings were applied, including Xeroform, Gauze and Finger Dressing  Please note, all sponge, needle, and instrument counts were correct prior to closure    Divinee magnification was utilized  The patient tolerated the procedure well       I was present for the entire procedure    Patient Disposition:  PACU , hemodynamically stable and extubated and stable    SIGNATURE: Maxim Velasquez MD  DATE: 07/24/18  TIME: 4:00 PM

## 2018-07-24 NOTE — DISCHARGE INSTRUCTIONS
Post Operative Instructions    You have had surgery on your arm today, please read and follow the information below:  · Elevate your hand above your elbow during the next 24-48 hours to help with swelling  · Place your hand and arm over your head with motion at your shoulder three times a day  · Do not apply any cream/ointment/oil to your incisions including antibiotics  · Do not soak your hands in standing water (dishwater, tubs, Jacuzzi's, pools, etc ) until given permission (typically 2-3 weeks after injury)    Call the office if you notice any:  · Increased numbness or tingling of your hand or fingers that is not relieved with elevation  · Increasing pain that is not controlled with medication  · Difficulty chewing, breathing, swallowing  · Chest pains or shortness of breath  · Fever over 101 4 degrees  Bandage: Do NOT remove bandage until follow-up appointment  Motion: Move fingers into a fist 5 times a day, DO NOT move any splinted fingers  Weight bearing status: The operated extremity should be non-weight bearing until further notice  Ice: Ice for 10 minutes every hour as needed for swelling x 24 hours  Sling: No sling necessary  Pain medication:   Tylenol Extended Release 650 mg every 8 hours  Norco/Hydrocodone one tab every 6 hours AS NEEDED for pain     Follow-up Appointment: 7-10 days  Please call the office if you have any questions or concerns regarding your post-operative care
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
## 2018-07-24 NOTE — INTERVAL H&P NOTE
H&P reviewed  After examining the patient I find no changes in the patients condition since the H&P had been written  Patient seen and examined  Patient with right hand index finger fracture status post splinting  Neurologically intact to right index finger  X-rays demonstrate T-type intra-articular right index finger proximal phalanx fracture  Patient for right hand index finger proximal phalanx fracture open reduction and internal fixation  Consent has been obtained
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
## 2018-08-01 ENCOUNTER — OFFICE VISIT (OUTPATIENT)
Dept: OBGYN CLINIC | Facility: HOSPITAL | Age: 28
End: 2018-08-01

## 2018-08-01 ENCOUNTER — OFFICE VISIT (OUTPATIENT)
Dept: OCCUPATIONAL THERAPY | Facility: HOSPITAL | Age: 28
End: 2018-08-01
Attending: ORTHOPAEDIC SURGERY
Payer: COMMERCIAL

## 2018-08-01 ENCOUNTER — HOSPITAL ENCOUNTER (OUTPATIENT)
Dept: RADIOLOGY | Facility: HOSPITAL | Age: 28
Discharge: HOME | End: 2018-08-01
Attending: ORTHOPAEDIC SURGERY
Payer: COMMERCIAL

## 2018-08-01 VITALS
BODY MASS INDEX: 23.46 KG/M2 | DIASTOLIC BLOOD PRESSURE: 72 MMHG | SYSTOLIC BLOOD PRESSURE: 124 MMHG | HEIGHT: 73 IN | HEART RATE: 71 BPM | WEIGHT: 177 LBS

## 2018-08-01 DIAGNOSIS — S62.610D CLOSED DISPLACED FRACTURE OF PROXIMAL PHALANX OF RIGHT INDEX FINGER WITH ROUTINE HEALING, SUBSEQUENT ENCOUNTER: ICD-10-CM

## 2018-08-01 DIAGNOSIS — S62.610D CLOSED DISPLACED FRACTURE OF PROXIMAL PHALANX OF RIGHT INDEX FINGER WITH ROUTINE HEALING, SUBSEQUENT ENCOUNTER: Primary | ICD-10-CM

## 2018-08-01 PROCEDURE — G8991 OTHER PT/OT GOAL STATUS: HCPCS | Performed by: OCCUPATIONAL THERAPIST

## 2018-08-01 PROCEDURE — L3913 HFO W/O JOINTS CF: HCPCS | Performed by: OCCUPATIONAL THERAPIST

## 2018-08-01 PROCEDURE — 73140 X-RAY EXAM OF FINGER(S): CPT

## 2018-08-01 PROCEDURE — G8990 OTHER PT/OT CURRENT STATUS: HCPCS | Performed by: OCCUPATIONAL THERAPIST

## 2018-08-01 PROCEDURE — 99024 POSTOP FOLLOW-UP VISIT: CPT | Performed by: ORTHOPAEDIC SURGERY

## 2018-08-01 PROCEDURE — G8992 OTHER PT/OT  D/C STATUS: HCPCS | Performed by: OCCUPATIONAL THERAPIST

## 2018-08-01 RX ORDER — IBUPROFEN 200 MG
TABLET ORAL EVERY 6 HOURS PRN
COMMUNITY

## 2018-08-01 NOTE — PROGRESS NOTES
Splint     Diagnosis:   1  Closed displaced fracture of proximal phalanx of right index finger with routine healing, subsequent encounter  Ambulatory referral to PT/OT hand therapy     Indication: Fracture    Location: Right  index finger  Supplies: Custom Fit Orthotic and Skin coverage   Splint type: Radial Gutter  Wearing Schedule: Night only and With Activity Only  Describe Position: MP 60 flex Ips neutral IF and LF    Precautions: Fracture    Patient or Caregiver expresses understanding of wearing Schedule and Precautions? Yes  Patient or Caregiver able to don/doff orthotic independently? Yes    Written orders provided to patient?  Yes  Orders Obtained: Written  Orders Obtained from: [facility]    Return for evaluation and treatment No

## 2018-08-01 NOTE — LETTER
August 1, 2018     Patient: Romulo Hansen   YOB: 1990   Date of Visit: 8/1/2018       To Whom it May Concern:    Romulo Hansen is under my professional care  He was seen in my office on 8/1/2018  He is unable to return to work at this time  Pt will be re-evaluated in 3 weeks  If you have any questions or concerns, please don't hesitate to call           Sincerely,          Carter Shanks MD        CC: No Recipients

## 2018-08-01 NOTE — PROGRESS NOTES
Assessment:   s/p ORIF right hand index finger intraarticular proximal phalanx fracture on 7/24/18    Plan:   The patient will meet with the OT in the office today for a Hand based radial gauntlet splint for right index and long finger  Pt was advised to was his hand with soap and water and to let his steri-strips fall off on their own  New steri strips were applied to the patient today  Follow Up:  3  week(s)    To Do Next Visit:  X-rays of the  right  index finger      CHIEF COMPLAINT:  Chief Complaint   Patient presents with    Right Index Finger - Post-op         SUBJECTIVE:  Aureliano Varghese is a 34y o  year old male who presents for follow up after s/p ORIF right hand index finger intraarticular proximal phalanx fracture on 7/24/18  Today patient has Stiffness/LROM         PHYSICAL EXAMINATION:  General: well developed and well nourished, alert, oriented times 3 and appears comfortable  Psychiatric: Normal    MUSCULOSKELETAL EXAMINATION:  Incision: Clean, dry, intact  Range of Motion: As expected  Neurovascular status: Neuro intact, good cap refill  Activity Restrictions: Restrictions: 1lb lifting restriction  Done today: splint removed      STUDIES REVIEWED:  Images were reviewd in PACS: proper fracture and hardware alignment      PROCEDURES PERFORMED:  Procedures  No Procedures performed today   Scribe Attestation    I,:   Katelyn Storm am acting as a scribe while in the presence of the attending physician :        I,:   Maxim Velasquez MD personally performed the services described in this documentation    as scribed in my presence :

## 2018-08-07 ENCOUNTER — OFFICE VISIT (OUTPATIENT)
Dept: SURGERY | Facility: CLINIC | Age: 28
End: 2018-08-07
Payer: COMMERCIAL

## 2018-08-07 VITALS
BODY MASS INDEX: 24.28 KG/M2 | WEIGHT: 183.2 LBS | TEMPERATURE: 99.2 F | SYSTOLIC BLOOD PRESSURE: 144 MMHG | DIASTOLIC BLOOD PRESSURE: 80 MMHG | HEIGHT: 73 IN

## 2018-08-07 DIAGNOSIS — S22.43XD MULTIPLE CLOSED FRACTURES OF RIBS OF BOTH SIDES WITH ROUTINE HEALING, SUBSEQUENT ENCOUNTER: Primary | ICD-10-CM

## 2018-08-07 DIAGNOSIS — S22.21XD CLOSED FRACTURE OF MANUBRIUM WITH ROUTINE HEALING, SUBSEQUENT ENCOUNTER: ICD-10-CM

## 2018-08-07 DIAGNOSIS — S22.009D CLOSED FRACTURE OF TRANSVERSE PROCESS OF THORACIC VERTEBRA WITH ROUTINE HEALING, SUBSEQUENT ENCOUNTER: ICD-10-CM

## 2018-08-07 PROCEDURE — 99213 OFFICE O/P EST LOW 20 MIN: CPT | Performed by: SURGERY

## 2018-08-07 RX ORDER — METHOCARBAMOL 500 MG/1
500 TABLET, FILM COATED ORAL 4 TIMES DAILY
Qty: 30 TABLET | Refills: 0 | Status: SHIPPED | OUTPATIENT
Start: 2018-08-07
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
## 2018-08-07 NOTE — ASSESSMENT & PLAN NOTE
-patient will need outpatient follow-up with the trauma team in 3 weeks  -no chest x-rays indicated at this time  -clear to auscultation bilaterally  -saturation is 98%  -tender to rib fracture location  -taking Tylenol as well as ibuprofen  -will start on Robaxin

## 2018-08-07 NOTE — LETTER
August 7, 2018     Patient: Reina Dent   YOB: 1990   Date of Visit: 8/7/2018       To Whom it May Concern:    Reina Dent is under my professional care  He was seen in my office on 8/7/2018  He may return to work on once cleared by the trauma team  Requires another trauma appointment on 8/28/18  Please allow patient appropriate time to recover       If you have any questions or concerns, please don't hesitate to call           Sincerely,          KATHY TRAUMA PROVIDER        CC: No Recipients

## 2018-08-28 NOTE — PROGRESS NOTES
Assessment:   S/P ORIF right index finger intraarticular proximal phalanx fracture on 7/24/18    Plan:   Xrays reviewed  Will have the patient continue use of his splint until I can have Dr Em Moore look at his Xrays  He can come out of splint for hygiene and gentle AROM - HEP shown today and formal therapy will be scheduled  Will call patient in regards to Dr Franklin Sanchez recommendations of continued use of splint    Follow Up:  4  week(s)    To Do Next Visit:  X-rays of the  right  index finger      CHIEF COMPLAINT:  No chief complaint on file  SUBJECTIVE:  Ita Catherine is a 34y o  year old male who presents for follow up S/P ORIF right index finger intraarticular proximal phalanx fracture on 7/24/18  Patient denies pain  Describes stiffness  States he wears is splint but is noted to not have this with him today  No n/t  PHYSICAL EXAMINATION:  General: well developed and well nourished, alert, oriented times 3 and appears comfortable  Psychiatric: Normal    MUSCULOSKELETAL EXAMINATION:  Incision: Clean, dry, intact  Range of Motion: Limited due to stiffness  Patient does have minimal flexion/extension at all 3 joints when isolated, but finger tends to move mostly just at the MP joint when he tries to make a fist  No concerning malrotation at this time  Neurovascular status: Neuro intact, good cap refill  Activity Restrictions: Restrictions: Continued limited weight bearing (1-2lbs)         STUDIES REVIEWED:  Images were reviewd in PACS: Xrays taken today show ORIF of index finger proximal phalanx  When compared to intra-op photos, it does appear that the proximal anterior aspect of bone has slipped slightly anteriorly  Does still appear to have well-maintained curvature of the MP joint        PROCEDURES PERFORMED:  Procedures  No Procedures performed today

## 2018-08-29 ENCOUNTER — HOSPITAL ENCOUNTER (OUTPATIENT)
Dept: RADIOLOGY | Facility: HOSPITAL | Age: 28
Discharge: HOME | End: 2018-08-29
Payer: COMMERCIAL

## 2018-08-29 ENCOUNTER — OFFICE VISIT (OUTPATIENT)
Dept: OBGYN CLINIC | Facility: HOSPITAL | Age: 28
End: 2018-08-29

## 2018-08-29 VITALS
SYSTOLIC BLOOD PRESSURE: 112 MMHG | DIASTOLIC BLOOD PRESSURE: 66 MMHG | WEIGHT: 193.8 LBS | BODY MASS INDEX: 25.69 KG/M2 | HEART RATE: 70 BPM | HEIGHT: 73 IN

## 2018-08-29 DIAGNOSIS — Z48.89 AFTERCARE FOLLOWING SURGERY: ICD-10-CM

## 2018-08-29 DIAGNOSIS — Z48.89 AFTERCARE FOLLOWING SURGERY: Primary | ICD-10-CM

## 2018-08-29 DIAGNOSIS — S62.610D CLOSED DISPLACED FRACTURE OF PROXIMAL PHALANX OF RIGHT INDEX FINGER WITH ROUTINE HEALING, SUBSEQUENT ENCOUNTER: ICD-10-CM

## 2018-08-29 PROCEDURE — 99024 POSTOP FOLLOW-UP VISIT: CPT | Performed by: PHYSICIAN ASSISTANT

## 2018-08-29 PROCEDURE — 73140 X-RAY EXAM OF FINGER(S): CPT

## 2018-09-05 ENCOUNTER — TELEPHONE (OUTPATIENT)
Dept: OBGYN CLINIC | Facility: HOSPITAL | Age: 28
End: 2018-09-05

## 2018-09-05 DIAGNOSIS — S62.610D CLOSED DISPLACED FRACTURE OF PROXIMAL PHALANX OF RIGHT INDEX FINGER WITH ROUTINE HEALING, SUBSEQUENT ENCOUNTER: Primary | ICD-10-CM

## 2018-09-05 NOTE — TELEPHONE ENCOUNTER
Call placed to the patient but he was unable to hear me  I called back and his phone went to voicemail and It is full and not accepting msgs  Call placed to his mother and msg left on her voicemail to get a msg to have patient call our office  When patient calls back he will be told okay to stop the splint and we would like him to do formal PT  If he will agree to formal PT we will let Mackenzie Sosa know and she can help set up per Leonardo Meza

## 2018-09-12 ENCOUNTER — EVALUATION (OUTPATIENT)
Dept: PHYSICAL THERAPY | Facility: CLINIC | Age: 28
End: 2018-09-12
Payer: COMMERCIAL

## 2018-09-12 DIAGNOSIS — S62.610D CLOSED DISPLACED FRACTURE OF PROXIMAL PHALANX OF RIGHT INDEX FINGER WITH ROUTINE HEALING, SUBSEQUENT ENCOUNTER: ICD-10-CM

## 2018-09-12 PROCEDURE — 97140 MANUAL THERAPY 1/> REGIONS: CPT

## 2018-09-12 PROCEDURE — 97110 THERAPEUTIC EXERCISES: CPT

## 2018-09-12 PROCEDURE — 97161 PT EVAL LOW COMPLEX 20 MIN: CPT

## 2018-09-12 PROCEDURE — G8984 CARRY CURRENT STATUS: HCPCS

## 2018-09-12 PROCEDURE — G8985 CARRY GOAL STATUS: HCPCS
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
## 2018-09-13 ENCOUNTER — TRANSCRIBE ORDERS (OUTPATIENT)
Dept: PHYSICAL THERAPY | Facility: CLINIC | Age: 28
End: 2018-09-13

## 2018-09-13 DIAGNOSIS — S62.610D CLOSED DISPLACED FRACTURE OF PROXIMAL PHALANX OF RIGHT INDEX FINGER WITH ROUTINE HEALING, SUBSEQUENT ENCOUNTER: Primary | ICD-10-CM

## 2018-09-14 ENCOUNTER — TELEPHONE (OUTPATIENT)
Dept: OBGYN CLINIC | Facility: HOSPITAL | Age: 28
End: 2018-09-14

## 2018-09-14 ENCOUNTER — OFFICE VISIT (OUTPATIENT)
Dept: PHYSICAL THERAPY | Facility: CLINIC | Age: 28
End: 2018-09-14
Payer: COMMERCIAL

## 2018-09-14 DIAGNOSIS — S62.610D CLOSED DISPLACED FRACTURE OF PROXIMAL PHALANX OF RIGHT INDEX FINGER WITH ROUTINE HEALING, SUBSEQUENT ENCOUNTER: Primary | ICD-10-CM

## 2018-09-14 PROCEDURE — 97110 THERAPEUTIC EXERCISES: CPT

## 2018-09-14 PROCEDURE — 97140 MANUAL THERAPY 1/> REGIONS: CPT

## 2018-09-14 NOTE — TELEPHONE ENCOUNTER
Ritesh Mcbride    Physical Therapy at [facility], [address] [facility]   Physical Therapy     [phone] fax  [phone] ph    She would like a protocol and any precautions for patient you would recommend

## 2018-09-14 NOTE — PROGRESS NOTES
Exercise Diary  9/11 9/14           Foam Blocks-Interossei  3 x 10           Tendon Glides: table top, hook fist, composite fist, straight fist Holding off composite fist; PT stabilizing hand 2 x 10 each pain free PT stabilizing hand 5 x 10 each pain free           Cone Stacking 1 x 5 3 x 15           Hook Fist Pegs                                                                                                                                                                                                                                 Modalities  9/12            P 10' pre

## 2018-09-14 NOTE — PROGRESS NOTES
Daily Note     Today's date: 2018  Patient name: Janneth Sandoval  : 1990  MRN: [id number]  Referring provider: Sergio Pinto PA-C  Dx:   Encounter Diagnosis     ICD-10-CM    1  Closed displaced fracture of proximal phalanx of right index finger with routine healing, subsequent encounter S62 610D        Start Time: 0950  Stop Time: 1050  Total time in clinic (min): 60 minutes    Subjective: Patient reports no increase in pain or changes in symptoms since his last visit  By end of treatment patient stated that his finger felt better and less stiff  Objective: See treatment diary below    Manual             STM  Wrist extensors & flexors, hand intrinsics,            Scar Tissue Massage             IASTM  Over foreare, palmar and dorsal interossei, Holding off on R index finger until further out           PIP/DIP Blocking Pain free, 2 x 10 each with PT stabilizing injury site  Pain free, 5 x 10 each with PT stabilizing injury site           Manual Stretching of wrist  Wrist flexors & extensors over ramp; pain free           PIP & DIP Gentle  PROM  Pain free 2 min            Intrinsic Plus/Table Tops                  Exercise Diary             Foam Blocks  Interossei abduction/adduction pain free           Tendon Glides: table top, hook fist , straight fist, composite fist Holding off composite fist; PT stabilizing hand 2 x 10 each pain free 20 x each           Cone Stacking 1 x 5 2 x 15           Hook Fist Pegs  unable           Fine Motor Peg Board  3 min           Cone Grasping  2 min           Patient education HEP  6 min                                                                                                                                                                                        Modalities             [facility] 10' pre    10'                                           Assessment: Tolerated treatment well   He has no increase in pain with manual tx or with TE's to promote AROM of finger and hand  PROM was gentle and completed with no increase in pain levels  Patient attempted hook fist to grab pegs, but was unable  He was able to wrap his R index finger around the bottom of a cone, but could not grasp upper portion of cone due to smaller circumference  Patient showed improvement in motor control with gentle blocking, especially at the DIP joint  Patient exhibited good technique with therapeutic exercises and would benefit from continued PT and was given instructions on how to safely complete his HEP to promote AROM and prevent further stiffening  Plan: Continue per plan of care

## 2018-09-14 NOTE — TELEPHONE ENCOUNTER
I did put on the rx to work on AROM and PROM of the finger, but no strengthening   I resent the rx to the fax # provided

## 2018-09-17 ENCOUNTER — OFFICE VISIT (OUTPATIENT)
Dept: PHYSICAL THERAPY | Facility: CLINIC | Age: 28
End: 2018-09-17
Payer: COMMERCIAL

## 2018-09-17 DIAGNOSIS — S62.610D CLOSED DISPLACED FRACTURE OF PROXIMAL PHALANX OF RIGHT INDEX FINGER WITH ROUTINE HEALING, SUBSEQUENT ENCOUNTER: Primary | ICD-10-CM

## 2018-09-17 PROCEDURE — 97110 THERAPEUTIC EXERCISES: CPT

## 2018-09-17 PROCEDURE — 97140 MANUAL THERAPY 1/> REGIONS: CPT

## 2018-09-17 NOTE — PROGRESS NOTES
Daily Note     Today's date: 2018  Patient name: Henry Feliciano  : 1990  MRN: [id number]  Referring provider: Igor Canales PA-C  Dx:   Encounter Diagnosis     ICD-10-CM    1  Closed displaced fracture of proximal phalanx of right index finger with routine healing, subsequent encounter S62 610D        Start Time: 7978  Stop Time: 0940  Total time in clinic (min): 50 minutes    Subjective: Patient reports that his finger has not had any symptom aggravation since last visit and he has been doing his HEP which is pain free  He feels he can move and control his finger more without as much effort       Objective: See treatment diary below  R Index Finger AROM:  MP Flexion: 60 degrees  PIP Flexion: 35 degrees  DIP Flexion: 30 degrees      Manual            STM  Wrist extensors & flexors, hand intrinsics,  Wrist extensors & flexors, hand intrinsics,           Scar Tissue Massage             IASTM  Over foreare, palmar and dorsal interossei, Holding off on R index finger until further out Over foreare, palmar and dorsal interossei, Holding off on R index finger until further out          PIP/DIP Blocking Pain free, 2 x 10 each with PT stabilizing injury site  Pain free, 5 x 10 each with PT stabilizing injury site Pain free, 5 x 10 each with PT stabilizing injury site          Manual Stretching of wrist  Wrist flexors & extensors over ramp; pain free Wrist flexors & extensors over ramp; pain free          PIP & DIP Gentle  PROM  Pain free 2 min  Pain free 2 min           Intrinsic Plus/Table Tops                  Exercise Diary            Foam Blocks  Interossei abduction/adduction pain free interossei abduction/adduction pain free          Tendon Glides: table top, hook fist , straight fist, composite fist Holding off composite fist; PT stabilizing hand 2 x 10 each pain free 20 x each 20 x each          Cone Stacking 1 x 5 2 x 15 2 x 15          Hook Fist Small  Pegs  unable unable          Fine Motor Peg Board  3 min 3 min          Cone Grasping  2 min 2 min          Patient education HEP  6 min 3 min          Large Hook Fist & Grasp Peg Board   2 x 15                                                                                                                                                                          Modalities  9/12 9/14 9/17          [facility] 10' pre    10' 6'                                          Assessment: Tolerated treatment well  He has no increase in pain with manual tx or with TE's to promote AROM of finger and hand  PROM was gentle and completed with no increase in pain levels  Patient attempted hook fist to grab pegs, but was unable with small pegs and required large pegs to learn motor control of hook fist and promote motion at the PIP joint  He was able to wrap his R index finger around the bottom to the top of a large cone, and was able to progress to smaller cones with a smaller circumference  Patient showed improvement in motor control with gentle blocking, at the PIP joint  Improvement in MP, PIP and DIP flexion AROM compared to IE but still below WNL  Patient exhibited good technique with therapeutic exercises and would benefit from continued PT promote AROM and prevent further stiffening of the R index finger  Plan: Continue per plan of care

## 2018-09-19 ENCOUNTER — OFFICE VISIT (OUTPATIENT)
Dept: PHYSICAL THERAPY | Facility: CLINIC | Age: 28
End: 2018-09-19
Payer: COMMERCIAL

## 2018-09-19 DIAGNOSIS — S62.610D CLOSED DISPLACED FRACTURE OF PROXIMAL PHALANX OF RIGHT INDEX FINGER WITH ROUTINE HEALING, SUBSEQUENT ENCOUNTER: Primary | ICD-10-CM

## 2018-09-19 PROCEDURE — 97140 MANUAL THERAPY 1/> REGIONS: CPT

## 2018-09-19 PROCEDURE — 97110 THERAPEUTIC EXERCISES: CPT

## 2018-09-19 NOTE — PROGRESS NOTES
Daily Note     Today's date: 2018  Patient name: Sondra ChahalB: 1990  MRN: [id number]  Referring provider: Jennifer Ureña PA-C  Dx:   Encounter Diagnosis     ICD-10-CM    1  Closed displaced fracture of proximal phalanx of right index finger with routine healing, subsequent encounter S62 610D        Start Time: 930  Stop Time: 1020  Total time in clinic (min): 50 minutes    Subjective: Patient reports that his finger has been feeling a lot better since beginning PT  He feels that now it is a lot less stiff and can more easily move the finger     Objective: See treatment diary below        Manual           STM  Wrist extensors & flexors, hand intrinsics,  Wrist extensors & flexors, hand intrinsics,  Wrist extensors & flexors, hand intrinsics,          Scar Tissue Massage    2 min          IASTM  Over foreare, palmar and dorsal interossei, Holding off on R index finger until further out Over foreare, palmar and dorsal interossei, Holding off on R index finger until further out Over foreare, palmar and dorsal interossei, Holding off on R index finger         PIP/DIP Blocking Pain free, 2 x 10 each with PT stabilizing injury site  Pain free, 5 x 10 each with PT stabilizing injury site Pain free, 5 x 10 each with PT stabilizing injury site Pain free, 5 x 10 each with PT stabilizing injury site         Manual Stretching of wrist  Wrist flexors & extensors over ramp; pain free Wrist flexors & extensors over ramp; pain free Wrist flexors & extensors over ramp; pain free         PIP & DIP Gentle  PROM  Pain free 2 min  Pain free 2 min  Pain free 2 min          MHP with concurrent composite fist stretch    10 min          Intrinsic Plus/Table Tops PROM     3 x 10              Exercise Diary           Foam Blocks  Interossei abduction/adduction pain free interossei abduction/adduction pain free          Tendon Glides: table top, hook fist , straight fist, composite fist Holding off composite fist; PT stabilizing hand 2 x 10 each pain free 20 x each 20 x each 20 x each         Cone Stacking 1 x 5 2 x 15 2 x 15 2 x 15         Hook Fist Small  Pegs  unable unable unable         Fine Motor Peg Board  3 min 3 min          Cone Grasping  2 min 2 min 2 min         Patient education HEP  6 min 3 min Tan putty exercises, wrapping hand with heat and coban, wrap KT taping for scar          Large Hook Fist & Grasp Peg Board   2 x 15          Tip To palm Peg Board    2 x 15         Tan Putty     general gripping, roll & pinch, finger logs, finger ring, pancake finger spread, and twirling 1 x 1 min each         Grasper     2 x 10         Button Object Manipulatoin    2 min         Yellow RB finger spread    2 min                                                                                                        Modalities  9/12 9/14 9/17          MHP 10' pre  With large dowel composite fist stretch  10' 10'    Pain free                                            Assessment: Tolerated treatment well  He has no increase in pain with manual tx or with TE's to promote AROM of finger and hand  Added tan putty exercises to strength the extrinsic and intrinsic muscles of the hand which were completed pain free and demonstrated poor motor control especially with twirling  Began composite fist stretch with large dowel while on MHP to help promote increase in ROM at the PIP  Also began use of KT tape to address scar on dorsal proximal phalanx which is contributing to limited flexion ROM at the PIP  Patient exhibited good technique with therapeutic exercises and would benefit from continued PT promote AROM and prevent further stiffening of the R index finger  Plan: Continue per plan of care

## 2018-09-21 ENCOUNTER — APPOINTMENT (OUTPATIENT)
Dept: PHYSICAL THERAPY | Facility: CLINIC | Age: 28
End: 2018-09-21
Payer: COMMERCIAL

## 2018-09-24 ENCOUNTER — APPOINTMENT (OUTPATIENT)
Dept: OCCUPATIONAL THERAPY | Facility: CLINIC | Age: 28
End: 2018-09-24
Payer: COMMERCIAL

## 2018-09-26 ENCOUNTER — APPOINTMENT (OUTPATIENT)
Dept: PHYSICAL THERAPY | Facility: CLINIC | Age: 28
End: 2018-09-26
Payer: COMMERCIAL

## 2018-09-26 ENCOUNTER — EVALUATION (OUTPATIENT)
Dept: OCCUPATIONAL THERAPY | Facility: CLINIC | Age: 28
End: 2018-09-26
Payer: COMMERCIAL

## 2018-09-26 DIAGNOSIS — S62.610D CLOSED DISPLACED FRACTURE OF PROXIMAL PHALANX OF RIGHT INDEX FINGER WITH ROUTINE HEALING, SUBSEQUENT ENCOUNTER: Primary | ICD-10-CM

## 2018-09-26 PROCEDURE — G8985 CARRY GOAL STATUS: HCPCS

## 2018-09-26 PROCEDURE — G8984 CARRY CURRENT STATUS: HCPCS

## 2018-09-26 PROCEDURE — G8986 CARRY D/C STATUS: HCPCS

## 2018-09-26 PROCEDURE — 97140 MANUAL THERAPY 1/> REGIONS: CPT
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
## 2018-09-28 ENCOUNTER — APPOINTMENT (OUTPATIENT)
Dept: PHYSICAL THERAPY | Facility: CLINIC | Age: 28
End: 2018-09-28
Payer: COMMERCIAL

## 2018-09-28 ENCOUNTER — HOSPITAL ENCOUNTER (OUTPATIENT)
Dept: RADIOLOGY | Facility: HOSPITAL | Age: 28
Discharge: HOME | End: 2018-09-28
Attending: ORTHOPAEDIC SURGERY

## 2018-09-28 ENCOUNTER — HOSPITAL ENCOUNTER (OUTPATIENT)
Dept: RADIOLOGY | Facility: HOSPITAL | Age: 28
Discharge: HOME | End: 2018-09-28
Attending: ORTHOPAEDIC SURGERY
Payer: COMMERCIAL

## 2018-09-28 ENCOUNTER — OFFICE VISIT (OUTPATIENT)
Dept: OCCUPATIONAL THERAPY | Facility: HOSPITAL | Age: 28
End: 2018-09-28
Attending: ORTHOPAEDIC SURGERY
Payer: COMMERCIAL

## 2018-09-28 ENCOUNTER — OFFICE VISIT (OUTPATIENT)
Dept: OBGYN CLINIC | Facility: HOSPITAL | Age: 28
End: 2018-09-28

## 2018-09-28 VITALS
WEIGHT: 198.2 LBS | HEART RATE: 73 BPM | BODY MASS INDEX: 26.27 KG/M2 | HEIGHT: 73 IN | DIASTOLIC BLOOD PRESSURE: 81 MMHG | SYSTOLIC BLOOD PRESSURE: 136 MMHG

## 2018-09-28 DIAGNOSIS — S62.610D CLOSED DISPLACED FRACTURE OF PROXIMAL PHALANX OF RIGHT INDEX FINGER WITH ROUTINE HEALING, SUBSEQUENT ENCOUNTER: Primary | ICD-10-CM

## 2018-09-28 DIAGNOSIS — Z48.89 AFTERCARE FOLLOWING SURGERY: Primary | ICD-10-CM

## 2018-09-28 DIAGNOSIS — Z48.89 AFTERCARE FOLLOWING SURGERY: ICD-10-CM

## 2018-09-28 PROCEDURE — G8991 OTHER PT/OT GOAL STATUS: HCPCS | Performed by: OCCUPATIONAL THERAPIST

## 2018-09-28 PROCEDURE — 73140 X-RAY EXAM OF FINGER(S): CPT

## 2018-09-28 PROCEDURE — 99024 POSTOP FOLLOW-UP VISIT: CPT | Performed by: ORTHOPAEDIC SURGERY

## 2018-09-28 PROCEDURE — L3933 FO W/O JOINTS CF: HCPCS | Performed by: OCCUPATIONAL THERAPIST

## 2018-09-28 PROCEDURE — G8990 OTHER PT/OT CURRENT STATUS: HCPCS | Performed by: OCCUPATIONAL THERAPIST

## 2018-09-28 NOTE — PROGRESS NOTES
Assessment:   S/P ORIF right index finger intraarticular proximal phalanx fracture on 7/24/18    Plan: At this time, can begin strengthening with therapy  Relative motion splint to be worn at all times other than hygiene     Follow Up:  6  week(s)    To Do Next Visit:  X-rays of the  left  index finger      CHIEF COMPLAINT:  Chief Complaint   Patient presents with    Right Index Finger - Follow-up         SUBJECTIVE:  Sondra Bingham is a 34y o  year old male who presents for follow up S/P ORIF right index finger intraarticular proximal phalanx fracture on 7/24/18  Patient states overall he's doing well  No major complaints of pain  States he has been going to therapy 3x/wk, has noticed some improvement but still having trouble making a fist        PHYSICAL EXAMINATION:  General: well developed and well nourished, alert, oriented times 3 and appears comfortable  Psychiatric: Normal    MUSCULOSKELETAL EXAMINATION:  Incision: Clean, dry, intact  Range of Motion: Limited due to stiffness  MP 70, PIP 32, DIP 35  Neurovascular status: Neuro intact, good cap refill  Activity Restrictions: No restrictions         STUDIES REVIEWED:  Images were reviewd in PACS: Xrays show well aligned proximal phalanx fx s/p ORIF   Some evidence of bony healing present      PROCEDURES PERFORMED:  Procedures  No Procedures performed today   Scribe Attestation    I,:   Roddy Dodd PA-C am acting as a scribe while in the presence of the attending physician :        I,:   Chino Denise MD personally performed the services described in this documentation    as scribed in my presence :

## 2018-09-28 NOTE — PROGRESS NOTES
Splint     Diagnosis:   1  Closed displaced fracture of proximal phalanx of right index finger with routine healing, subsequent encounter       Indication: Fracture    Location: Right  index finger  Supplies: Custom Fit Orthotic  Splint type: relative motion splint  Wearing Schedule: FT except hygeine  Describe Position: index MCP in ext     Precautions: universal  Patient or Caregiver expresses understanding of wearing Schedule and Precautions? Yes  Patient or Caregiver able to don/doff orthotic independently? Yes    Written orders provided to patient?  Yes  Orders Obtained: Written  Orders Obtained from: Dr Jone Cervantes    Return for evaluation and treatment Yes

## 2018-10-12 ENCOUNTER — TELEPHONE (OUTPATIENT)
Dept: OBGYN CLINIC | Facility: HOSPITAL | Age: 28
End: 2018-10-12

## 2018-10-12 NOTE — LETTER
October 13, 2018     Patient: Hermes Murillo   YOB: 1990   Date of Visit: 10/12/2018       To Whom it May Concern:    Hermes Murillo is under my professional care  He was seen in my office on 10/12/2018  He may return to  work light duty    If you have any questions or concerns, please don't hesitate to call           Sincerely,          Patrice Can MD        CC: work

## 2018-10-12 NOTE — TELEPHONE ENCOUNTER
Call from patient   Call back# [phone]  Fax #   Simon Gudino      Patient is requesting a work note stating he can return to work light duty  Patient states he is already back at work  Patient will call back to with fax number         Type of work-

## 2019-02-04 ENCOUNTER — OFFICE VISIT (OUTPATIENT)
Dept: URGENT CARE | Facility: CLINIC | Age: 29
End: 2019-02-04
Payer: COMMERCIAL

## 2019-02-04 ENCOUNTER — APPOINTMENT (OUTPATIENT)
Dept: RADIOLOGY | Facility: CLINIC | Age: 29
End: 2019-02-04
Payer: COMMERCIAL

## 2019-02-04 VITALS
BODY MASS INDEX: 26.52 KG/M2 | SYSTOLIC BLOOD PRESSURE: 132 MMHG | TEMPERATURE: 97.4 F | WEIGHT: 195.8 LBS | HEART RATE: 83 BPM | HEIGHT: 72 IN | DIASTOLIC BLOOD PRESSURE: 80 MMHG | OXYGEN SATURATION: 100 % | RESPIRATION RATE: 16 BRPM

## 2019-02-04 DIAGNOSIS — M54.50 ACUTE EXACERBATION OF CHRONIC LOW BACK PAIN: ICD-10-CM

## 2019-02-04 DIAGNOSIS — M54.50 ACUTE EXACERBATION OF CHRONIC LOW BACK PAIN: Primary | ICD-10-CM

## 2019-02-04 DIAGNOSIS — G89.29 ACUTE EXACERBATION OF CHRONIC LOW BACK PAIN: Primary | ICD-10-CM

## 2019-02-04 DIAGNOSIS — G89.29 ACUTE EXACERBATION OF CHRONIC LOW BACK PAIN: ICD-10-CM

## 2019-02-04 PROCEDURE — 99213 OFFICE O/P EST LOW 20 MIN: CPT | Performed by: PHYSICIAN ASSISTANT

## 2019-02-04 PROCEDURE — 72100 X-RAY EXAM L-S SPINE 2/3 VWS: CPT

## 2019-02-04 NOTE — PROGRESS NOTES
[address]        NAME: Daniel Lancaster is a 34 y o  male  : 1990    MRN: [id number]  DATE: 2019  TIME: 7:55 AM    Assessment and Plan   Acute exacerbation of chronic low back pain [M54 5, G89 29]  1  Acute exacerbation of chronic low back pain  XR spine lumbar 2 or 3 views injury         Patient Instructions     Discussed condition with pt  He has an acute exacerbation of chronic LBP  X-ray was reviewed which shows mild disc space narrowing at L5-S1 but otherwise unremarkable  I rec intermittent ice, gentle stretching, and NSAIDs when it flares up  His exam overall is benign today and I feel that he can adequately perform his job duties currently without restrictions and gave him letter for work denoting this  He was given a copy of his films  I gave him list of local PCPs to get established to request referral to orthopaedics  Follow up with PCP in 3-5 days  Proceed to  ER if symptoms worsen  Chief Complaint     Chief Complaint   Patient presents with    Back Pain     follow up on back injury 2019 has previous back injury wants clearance to go back to work          History of Present Illness       Pt presents one month after injuring his lower back  He reports he has long had trouble with his sciatic nerve as well as disc trouble  He is here because he needs a note stating that he is fit to go back to work  He was seen for the back injury in [place] and had back X-rays performed but is unsure of results  He reports at the time, he went to lift something, sneezed at the same time, and pulled his back out  Since the injury, he has rehabbed conservatively and over the past 1-2 weeks he has been feeling a lot better  He is a  in the union and feels he can perform his full job duties without restrictions at this time  He does not have a PCP  Review of Systems   Review of Systems   Constitutional: Negative  Respiratory: Negative      Cardiovascular: Negative  Gastrointestinal: Negative  Genitourinary: Negative  Musculoskeletal: Positive for back pain (Lower)  Neurological: Negative            Current Medications       Current Outpatient Prescriptions:     ibuprofen (MOTRIN) 200 mg tablet, Take by mouth every 6 (six) hours as needed for mild pain, Disp: , Rfl:     diazepam (VALIUM) 5 mg tablet, Take 1 tablet (5 mg total) by mouth every 6 (six) hours for 10 days, Disp: 20 tablet, Rfl: 0    docusate sodium (COLACE) 100 mg capsule, Take 1 capsule (100 mg total) by mouth 2 (two) times a day (Patient not taking: Reported on 8/29/2018 ), Disp: 10 capsule, Rfl: 0    HYDROcodone-acetaminophen (NORCO) 5-325 mg per tablet, Take 1 tablet by mouth every 6 (six) hours as needed for pain for up to 20 doses Max Daily Amount: 4 tablets (Patient not taking: Reported on 8/29/2018 ), Disp: 20 tablet, Rfl: 0    methocarbamol (ROBAXIN) 500 mg tablet, Take 1 tablet (500 mg total) by mouth 4 (four) times a day (Patient not taking: Reported on 8/29/2018 ), Disp: 30 tablet, Rfl: 0    senna (SENOKOT) 8 6 mg, Take 1 tablet (8 6 mg total) by mouth daily at bedtime (Patient not taking: Reported on 8/29/2018 ), Disp: 120 each, Rfl: 0    Current Allergies     Allergies as of 02/04/2019 - Reviewed 02/04/2019   Allergen Reaction Noted    Shellfish-derived products  07/17/2018            The following portions of the patient's history were reviewed and updated as appropriate: allergies, current medications, past family history, past medical history, past social history, past surgical history and problem list      Past Medical History:   Diagnosis Date    Ear tumors        Past Surgical History:   Procedure Laterality Date    EAR SURGERY      SC OPEN TX ARTICULAR FRACTURE MCP/IP JOINT EA Right 7/24/2018    Procedure: OPEN REDUCTION W/ INTERNAL FIXATION (ORIF) HAND, RIGHT INDEX FINGER INTRARTICULAR PROXIMAL PHALANX FRACTURE;  Surgeon: Leopold Mitts, MD;  Location: [facility] OR;  Service: Orthopedics       Family History   Problem Relation Age of Onset    Heart disease Brother     Heart disease Father          Medications have been verified  Objective   /80   Pulse 83   Temp (!) 97 4 °F (36 3 °C)   Resp 16   Ht 5' 11 5" (1 816 m)   Wt 88 8 kg (195 lb 12 8 oz)   SpO2 100%   BMI 26 93 kg/m²        Physical Exam     Physical Exam   Constitutional: He is oriented to person, place, and time  He appears well-developed and well-nourished  No distress  Musculoskeletal:   Minimal TTP right lower lumbar paraspinals but no midline tenderness or spasm noted and AROM is fully intact without difficulty  Negative straight leg raises B/L  Neurological: He is alert and oriented to person, place, and time  He has normal reflexes  Psychiatric: He has a normal mood and affect  Vitals reviewed

## 2019-02-04 NOTE — LETTER
February 4, 2019     Patient: Jose Cook   YOB: 1990   Date of Visit: 2/4/2019       To Whom It May Concern: It is my medical opinion that Jose Cook may return to full duty immediately with no restrictions  He was seen and examined today for chronic lower back issues  After thorough examination and X-rays were obtained and reviewed, I feel patient is able to perform his full job duties without restriction or accommodation  He will be establishing care with a primary care provider in the very near future to have this further managed  If you have any questions or concerns, please don't hesitate to call           Sincerely,        Erik Cortes PA-C    CC: No Recipients

## 2019-02-04 NOTE — PATIENT INSTRUCTIONS
Lower Back Exercises   WHAT YOU NEED TO KNOW:   Lower back exercises help heal and strengthen your back muscles to prevent another injury  Ask your healthcare provider if you need to see a physical therapist for more advanced exercises  DISCHARGE INSTRUCTIONS:   Return to the emergency department if:   · You have severe pain that prevents you from moving  Contact your healthcare provider if:   · Your pain becomes worse  · You have new pain  · You have questions or concerns about your condition or care  Do lower back exercises safely:   · Do the exercises on a mat or firm surface  (not on a bed) to support your spine and prevent low back pain  · Move slowly and smoothly  Avoid fast or jerky motions  · Breathe normally  Do not hold your breath  · Stop if you feel pain  It is normal to feel some discomfort at first  Regular exercise will help decrease your discomfort over time  Lower back exercises: Your healthcare provider may recommend that you do back exercises 10 to 30 minutes each day  He may also recommend that you do exercises 1 to 3 times each day  Ask your healthcare provider which exercises are best for you and how often to do them  · Ankle pumps:  Lie on your back  Move your foot up (with your toes pointing toward your head)  Then, move your foot down (with your toes pointing away from you)  Repeat this exercise 10 times on each side  · Heel slides:  Lie on your back  Slowly bend one leg and then straighten it  Next, bend the other leg and then straighten it  Repeat 10 times on each side  · Pelvic tilt:  Lie on your back with your knees bent and feet flat on the floor  Place your arms in a relaxed position beside your body  Tighten the muscles of your abdomen and flatten your back against the floor  Hold for 5 seconds  Repeat 5 times  · Back stretch:  Lie on your back with your hands behind your head   Bend your knees and turn the lower half of your body to one side  Hold this position for 10 seconds  Repeat 3 times on each side  · Straight leg raises:  Lie on your back with one leg straight  Bend the other knee  Tighten your abdomen and then slowly lift the straight leg up about 6 to 12 inches off the floor  Hold for 1 to 5 seconds  Lower your leg slowly  Repeat 10 times on each leg  · Knee-to-chest:  Lie on your back with your knees bent and feet flat on the floor  Pull one of your knees toward your chest and hold it there for 5 seconds  Return your leg to the starting position  Lift the other knee toward your chest and hold for 5 seconds  Do this 5 times on each side  · Cat and camel:  Place your hands and knees on the floor  Arch your back upward toward the ceiling and lower your head  Round out your spine as much as you can  Hold for 5 seconds  Lift your head upward and push your chest downward toward the floor  Hold for 5 seconds  Do 3 sets or as directed  · Wall squats:  Stand with your back against a wall  Tighten the muscles of your abdomen  Slowly lower your body until your knees are bent at a 45 degree angle  Hold this position for 5 seconds  Slowly move back up to a standing position  Repeat 10 times  · Curl up:  Lie on your back with your knees bent and feet flat on the floor  Place your hands, palms down, underneath the curve in your lower back  Next, with your elbows on the floor, lift your shoulders and chest 2 to 3 inches  Keep your head in line with your shoulders  Hold this position for 5 seconds  When you can do this exercise without pain for 10 to 15 seconds, you may add a rotation  While your shoulders and chest are lifted off the ground, turn slightly to the left and hold  Repeat on the other side  · Bird dog:  Place your hands and knees on the floor  Keep your wrists directly below your shoulders and your knees directly below your hips   Pull your belly button in toward your spine  Do not flatten or arch your back  Tighten your abdominal muscles  Raise one arm straight out so that it is aligned with your head  Next, raise the leg opposite your arm  Hold this position for 15 seconds  Lower your arm and leg slowly and change sides  Do 5 sets  © 2017 [employer] Information is for End User's use only and may not be sold, redistributed or otherwise used for commercial purposes  All illustrations and images included in CareNotes® are the copyrighted property of A [employer] "[employer] , [employer]  or Erick Nathan  The above information is an  only  It is not intended as medical advice for individual conditions or treatments  Talk to your doctor, nurse or pharmacist before following any medical regimen to see if it is safe and effective for you

## 2024-03-10 NOTE — ASSESSMENT & PLAN NOTE
- Continue multimodal pain management  Pain contract reviewed and signed prior to discharge  - Aggressive pulmonary toilet  - Incentive spirometery  - Cardiology consulted, appreciate recs  ECHO reviewed  Mild TR  Otherwise WNL  No further work up needed  - SCDs/Lovenox  - Stable for discharge on 07/20/2018 with outpatient follow-up in the trauma clinic in 2-3 weeks  Alert-The patient is alert, awake and responds to voice. The patient is oriented to time, place, and person. The triage nurse is able to obtain subjective information.

## (undated) DEVICE — ACE WRAP 3 IN VELCRO LATEX FREE

## (undated) DEVICE — ADHESIVE SKN CLSR HISTOACRYL FLEX 0.5ML LF

## (undated) DEVICE — INTENDED FOR TISSUE SEPARATION, AND OTHER PROCEDURES THAT REQUIRE A SHARP SURGICAL BLADE TO PUNCTURE OR CUT.: Brand: BARD-PARKER SAFETY BLADES SIZE 15, STERILE

## (undated) DEVICE — GAUZE SPONGES,16 PLY: Brand: CURITY

## (undated) DEVICE — SUT PROLENE 4-0 PS-2 18 IN 8682G

## (undated) DEVICE — DRAPE C-ARM X-RAY

## (undated) DEVICE — GLOVE INDICATOR PI UNDERGLOVE SZ 8 BLUE

## (undated) DEVICE — SUT VICRYL 3-0 RB-1 18 IN J713D

## (undated) DEVICE — CURITY STRETCH BANDAGE: Brand: CURITY

## (undated) DEVICE — 3M™ STERI-STRIP™ REINFORCED ADHESIVE SKIN CLOSURES, R1547, 1/2 IN X 4 IN (12 MM X 100 MM), 6 STRIPS/ENVELOPE: Brand: 3M™ STERI-STRIP™

## (undated) DEVICE — CUFF TOURNIQUET 18 X 4 IN QUICK CONNECT DISP 1 BLADDER

## (undated) DEVICE — CHLORAPREP HI-LITE 26ML ORANGE

## (undated) DEVICE — PADDING CAST 4 IN  COTTON STRL

## (undated) DEVICE — GLOVE SRG BIOGEL 7.5

## (undated) DEVICE — DISPOSABLE EQUIPMENT COVER: Brand: SMALL TOWEL DRAPE

## (undated) DEVICE — SUT MONOCRYL 4-0 PS-2 18 IN Y496G

## (undated) DEVICE — 1.1MM DRILL BIT/MQC FOR THREADED HOLE/56MM

## (undated) DEVICE — STERILE BETHLEHEM PLASTIC HAND: Brand: CARDINAL HEALTH